# Patient Record
Sex: MALE | URBAN - METROPOLITAN AREA
[De-identification: names, ages, dates, MRNs, and addresses within clinical notes are randomized per-mention and may not be internally consistent; named-entity substitution may affect disease eponyms.]

---

## 2020-03-27 ENCOUNTER — NURSE TRIAGE (OUTPATIENT)
Dept: TELEHEALTH | Age: 25
End: 2020-03-27

## 2020-10-19 ENCOUNTER — HOSPITAL ENCOUNTER (OUTPATIENT)
Age: 25
Discharge: HOME | End: 2020-10-19
Payer: COMMERCIAL

## 2020-10-19 VITALS
DIASTOLIC BLOOD PRESSURE: 79 MMHG | TEMPERATURE: 98 F | HEIGHT: 66 IN | WEIGHT: 168 LBS | RESPIRATION RATE: 17 BRPM | SYSTOLIC BLOOD PRESSURE: 117 MMHG | HEART RATE: 67 BPM | BODY MASS INDEX: 27 KG/M2 | OXYGEN SATURATION: 97 %

## 2020-10-19 DIAGNOSIS — J06.9 UPPER RESPIRATORY TRACT INFECTION, UNSPECIFIED TYPE: Primary | ICD-10-CM

## 2020-10-19 DIAGNOSIS — Z20.822 CLOSE EXPOSURE TO COVID-19 VIRUS: ICD-10-CM

## 2020-10-19 PROCEDURE — 87081 CULTURE SCREEN ONLY: CPT | Performed by: NURSE PRACTITIONER

## 2020-10-19 PROCEDURE — 87430 STREP A AG IA: CPT | Performed by: NURSE PRACTITIONER

## 2020-10-19 PROCEDURE — 99203 OFFICE O/P NEW LOW 30 MIN: CPT

## 2020-10-19 PROCEDURE — 99204 OFFICE O/P NEW MOD 45 MIN: CPT

## 2020-10-19 NOTE — ED PROVIDER NOTES
Patient Seen in: [facility]      History   Patient presents with:  Testing    Stated Complaint: test,exposed,cough,sore throat    HPI  Patient is a 31-year-old male without significant medical history presents with 5-day history of dysgeu ill-appearing, toxic-appearing or diaphoretic. HENT:      Mouth/Throat:      Mouth: Mucous membranes are moist.      Pharynx: No oropharyngeal exudate or posterior oropharyngeal erythema.    Eyes:      Conjunctiva/sclera: Conjunctivae normal.   Cardiovasc

## 2020-10-19 NOTE — ED INITIAL ASSESSMENT (HPI)
patient states developed symptoms 5 days ago.   Lost of taste, non productive cough, sore throat  Denies any fever

## 2020-11-06 ENCOUNTER — APPOINTMENT (OUTPATIENT)
Dept: GENERAL RADIOLOGY | Age: 25
End: 2020-11-06
Attending: PHYSICIAN ASSISTANT
Payer: COMMERCIAL

## 2020-11-06 ENCOUNTER — HOSPITAL ENCOUNTER (OUTPATIENT)
Age: 25
Discharge: HOME | End: 2020-11-06
Payer: COMMERCIAL

## 2020-11-06 VITALS
RESPIRATION RATE: 16 BRPM | TEMPERATURE: 98 F | SYSTOLIC BLOOD PRESSURE: 141 MMHG | HEART RATE: 74 BPM | DIASTOLIC BLOOD PRESSURE: 91 MMHG | OXYGEN SATURATION: 97 %

## 2020-11-06 DIAGNOSIS — R05.9 COUGH: ICD-10-CM

## 2020-11-06 DIAGNOSIS — Z20.822 SUSPECTED COVID-19 VIRUS INFECTION: Primary | ICD-10-CM

## 2020-11-06 PROCEDURE — 71046 X-RAY EXAM CHEST 2 VIEWS: CPT | Performed by: PHYSICIAN ASSISTANT

## 2020-11-06 PROCEDURE — 99213 OFFICE O/P EST LOW 20 MIN: CPT

## 2020-11-06 RX ORDER — DEXAMETHASONE SODIUM PHOSPHATE 4 MG/ML
6 INJECTION, SOLUTION INTRA-ARTICULAR; INTRALESIONAL; INTRAMUSCULAR; INTRAVENOUS; SOFT TISSUE ONCE
Status: COMPLETED | OUTPATIENT
Start: 2020-11-06 | End: 2020-11-06

## 2020-11-06 NOTE — ED INITIAL ASSESSMENT (HPI)
Mom and dad had covid, 2 other roommates covid +; pt tested negative on 10/19/20    Pt with cough/sob x 4-5 days; no fever

## 2020-11-06 NOTE — ED PROVIDER NOTES
MRN:[id number]                      After Visit Summary   9/11/2020    Melina Vazquez    MRN: [id number]           Visit Information        Provider Department      9/11/2020  8:15 AM Yani Whelan, PT [facility], [place], Physical Therapy - Outpatient        Your next 10 appointments already scheduled    Sep 25, 2020  8:15 AM CDT  Ehl/David Treatment with Yani Whelan, PT  [facility] [place], Physical Therapy - Outpatient ([facility]) [address], [facility] [address]  [place] [address]  [phone]      Oct 09, 2020  8:15 AM CDT  Ehl/David Treatment with Yani Whelan, PT  [facility] [place], Physical Therapy - Outpatient ([facility]) [address] [place] [address], [facility] [address]  [place] [address]  [phone]      Oct 23, 2020  8:15 AM CDT  Ehl/David Treatment with Yani Whelan, SERENE  [facility], Physical Therapy - Outpatient ([facility]) [address] [place] [address], [facility] [address]  [place] [address]  [phone]           Further instructions from your care team       Look and see if you have leg weights. If not, it might be worth investing in a set of adjustable weights.    Example: https://www.amazon.com/[employer]-Ankle-Weights-Adjusted-Weight/dp/N8405SAINP/ref=asc_df_B0747ZTVML/?tag=hyprod-20&linkCode=df0&eurmqi=230197607162&hvpos=&hvnetw=g&cjnjmk=[email or web address]&hvpone=&hvptwo=&hvqmt=&hvdev=c&hvdvcmdl=&hvlocint=&zxafjqsk=[email or web address]&hvtargid=[email or web address]&psc=1        Reaching below your navel   - make sure you bend with your knees AND your back    MyChart Information    [employer] gives you secure access to your electronic health record. If you see a primary care provider, you can also send messages to your care team and make appointments. If you have questions, please call your primary care clinic.  If you do not have a primary care provider, please call  Patient Seen in: [facility]      History   Patient presents with:  Cough/URI  Testing    Stated Complaint: COVID TEST    HPI    67-year-old male who comes in today concerned for COVID-19.   He states he has a documented case of COVID-19 in [phone] and they will assist you.       Care EveryWhere ID    This is your Care EveryWhere ID. This could be used by other organizations to access your [place] medical records  [id number]       Equal Access to Services    LENARD DERAS: Bettina Mesa, wayobanyda luyadiraadaha, qaybta kaalmada berenice, sundar deras. So [facility] [phone].    ATENCIÓN: Si habla español, tiene a swanson disposición servicios gratuitos de asistencia lingüística. Llame al [phone].    We comply with applicable federal and state civil rights laws, including the [place] Human Rights Act. We do not discriminate on the basis of race, color, creed, Islam, national origin, marital status, age, disability, sex, sexual orientation, or gender identity.        Clear to auscultation bilaterally, respirations unlabored. No wheezing, rales or rhonchi.        ED Course     Labs Reviewed   SARS-COV-2 RNA,QUAL RT-PCR ([facility])          Xr Chest Pa + Lat Chest (cpt=71046)    Result Date: 11/6/2020  PROCEDURE:  XR CHEST PA the patient that emergent conditions may arise to return to the immediate care or ER for new, worsening or any persistent conditions. I've explained the importance of following up with his doctor- No primary care provider on file. - as instructed.   The p

## 2020-12-10 ENCOUNTER — HOSPITAL ENCOUNTER (OUTPATIENT)
Age: 25
Discharge: HOME | End: 2020-12-10
Payer: COMMERCIAL

## 2020-12-10 ENCOUNTER — APPOINTMENT (OUTPATIENT)
Dept: GENERAL RADIOLOGY | Age: 25
End: 2020-12-10
Attending: PHYSICIAN ASSISTANT
Payer: COMMERCIAL

## 2020-12-10 VITALS
OXYGEN SATURATION: 98 % | BODY MASS INDEX: 28.32 KG/M2 | RESPIRATION RATE: 16 BRPM | HEIGHT: 65 IN | DIASTOLIC BLOOD PRESSURE: 71 MMHG | HEART RATE: 107 BPM | WEIGHT: 170 LBS | TEMPERATURE: 98 F | SYSTOLIC BLOOD PRESSURE: 124 MMHG

## 2020-12-10 DIAGNOSIS — S52.615A CLOSED NONDISPLACED FRACTURE OF STYLOID PROCESS OF LEFT ULNA, INITIAL ENCOUNTER: Primary | ICD-10-CM

## 2020-12-10 PROCEDURE — 99214 OFFICE O/P EST MOD 30 MIN: CPT

## 2020-12-10 PROCEDURE — 73130 X-RAY EXAM OF HAND: CPT | Performed by: PHYSICIAN ASSISTANT

## 2020-12-10 PROCEDURE — 73110 X-RAY EXAM OF WRIST: CPT | Performed by: PHYSICIAN ASSISTANT

## 2020-12-11 NOTE — ED PROVIDER NOTES
Patient Seen in: [facility]      History   Patient presents with:  Arm or Hand Injury    Stated Complaint: Left arm injury    HPI    71-year-old male presents to the immediate care for evaluation of left arm pain that started just prior t and no deformity. Left forearm: Normal.      Left hand: He exhibits tenderness (across dorsum). He exhibits normal range of motion, no deformity and no swelling. Normal sensation noted. Normal strength noted. Skin:     General: Skin is warm and dry. subtle nondisplaced fracture of the ulnar styloid. Please correlate clinically.    Dictated by (CST): Donny Schilder, DO on 12/10/2020 at 8:02 PM     Finalized by (CST): Donny Schilder,  on 12/10/2020 at 8:02 PM             MDM          26-year-old male with

## 2020-12-11 NOTE — ED INITIAL ASSESSMENT (HPI)
Micheal Mariee PA-C at the bedside assessing. Patient here with left hand, wrist, and forearm pain that occurred this evening around 1900 when he was running and someone hit him from the side. He states he fell onto concrete. He has limited ROM.

## 2020-12-14 ENCOUNTER — OFFICE VISIT (OUTPATIENT)
Dept: ORTHOPEDICS CLINIC | Facility: CLINIC | Age: 25
End: 2020-12-14
Payer: COMMERCIAL

## 2020-12-14 VITALS — OXYGEN SATURATION: 99 % | HEART RATE: 80 BPM

## 2020-12-14 DIAGNOSIS — S52.612A TRAUMATIC CLOSED FRACTURE OF ULNAR STYLOID WITH MINIMAL DISPLACEMENT, LEFT, INITIAL ENCOUNTER: Primary | ICD-10-CM

## 2020-12-14 PROCEDURE — 25650 CLTX ULNAR STYLOID FRACTURE: CPT | Performed by: ORTHOPAEDIC SURGERY

## 2020-12-14 PROCEDURE — 99203 OFFICE O/P NEW LOW 30 MIN: CPT | Performed by: ORTHOPAEDIC SURGERY

## 2020-12-17 NOTE — H&P
[facility] New Patient Note    CC: Left wrist pain    DOI: 12/10/2020    HPI: This 26year old RHD  male with with a injury while skateboarding. He has got moderate amounts of left wrist pain.   He was seen in the emergency department where an ulnar Psychiatric/Behavioral: Negative for agitation and behavioral problems. The patient is not nervous/anxious. Physical Exam:    Pulse 80   SpO2 99%     Constitutional: Patient is oriented to person, place, and time.  Patient appears well-developed and

## 2020-12-22 ENCOUNTER — HOSPITAL ENCOUNTER (OUTPATIENT)
Dept: GENERAL RADIOLOGY | Age: 25
Discharge: HOME | End: 2020-12-22
Attending: ORTHOPAEDIC SURGERY
Payer: COMMERCIAL

## 2020-12-22 ENCOUNTER — OFFICE VISIT (OUTPATIENT)
Dept: ORTHOPEDICS CLINIC | Facility: CLINIC | Age: 25
End: 2020-12-22
Payer: COMMERCIAL

## 2020-12-22 VITALS — OXYGEN SATURATION: 98 % | HEART RATE: 80 BPM

## 2020-12-22 DIAGNOSIS — S52.612A TRAUMATIC CLOSED FRACTURE OF ULNAR STYLOID WITH MINIMAL DISPLACEMENT, LEFT, INITIAL ENCOUNTER: Primary | ICD-10-CM

## 2020-12-22 DIAGNOSIS — S52.612A TRAUMATIC CLOSED FRACTURE OF ULNAR STYLOID WITH MINIMAL DISPLACEMENT, LEFT, INITIAL ENCOUNTER: ICD-10-CM

## 2020-12-22 PROCEDURE — 99024 POSTOP FOLLOW-UP VISIT: CPT | Performed by: ORTHOPAEDIC SURGERY

## 2020-12-22 PROCEDURE — 73110 X-RAY EXAM OF WRIST: CPT | Performed by: ORTHOPAEDIC SURGERY

## 2021-01-01 ENCOUNTER — MED REC SCAN ONLY (OUTPATIENT)
Dept: ORTHOPEDICS CLINIC | Facility: CLINIC | Age: 26
End: 2021-01-01

## 2021-01-06 ENCOUNTER — OFFICE VISIT (OUTPATIENT)
Dept: ORTHOPEDICS CLINIC | Facility: CLINIC | Age: 26
End: 2021-01-06
Payer: COMMERCIAL

## 2021-01-06 ENCOUNTER — HOSPITAL ENCOUNTER (OUTPATIENT)
Dept: GENERAL RADIOLOGY | Age: 26
Discharge: HOME | End: 2021-01-06
Attending: ORTHOPAEDIC SURGERY
Payer: COMMERCIAL

## 2021-01-06 VITALS — HEART RATE: 104 BPM | OXYGEN SATURATION: 98 %

## 2021-01-06 DIAGNOSIS — S52.612A TRAUMATIC CLOSED FRACTURE OF ULNAR STYLOID WITH MINIMAL DISPLACEMENT, LEFT, INITIAL ENCOUNTER: Primary | ICD-10-CM

## 2021-01-06 DIAGNOSIS — M25.532 WRIST PAIN, LEFT: ICD-10-CM

## 2021-01-06 PROCEDURE — 99024 POSTOP FOLLOW-UP VISIT: CPT | Performed by: ORTHOPAEDIC SURGERY

## 2021-01-06 PROCEDURE — 73110 X-RAY EXAM OF WRIST: CPT | Performed by: ORTHOPAEDIC SURGERY

## 2021-01-06 NOTE — PROGRESS NOTES
[facility] New Patient Note    CC: Left wrist pain    DOI: 12/10/2020    HPI: This 26year old RHD  male with with a injury while skateboarding. He has got moderate amounts of left wrist pain.   He was seen in the emergency department where an ulnar Physical Exam:    Pulse 104   SpO2 98%     Constitutional: Patient is oriented to person, place, and time. Patient appears well-developed and well-nourished. No distress. Head: Normocephalic. Eyes: Pupils are equal, round, and reactive to light.  C

## 2021-01-22 ENCOUNTER — HOSPITAL ENCOUNTER (OUTPATIENT)
Dept: MRI IMAGING | Facility: HOSPITAL | Age: 26
Discharge: HOME | End: 2021-01-22
Attending: ORTHOPAEDIC SURGERY
Payer: COMMERCIAL

## 2021-01-22 DIAGNOSIS — S52.612A TRAUMATIC CLOSED FRACTURE OF ULNAR STYLOID WITH MINIMAL DISPLACEMENT, LEFT, INITIAL ENCOUNTER: ICD-10-CM

## 2021-01-22 PROCEDURE — 73221 MRI JOINT UPR EXTREM W/O DYE: CPT | Performed by: ORTHOPAEDIC SURGERY

## 2021-02-04 ENCOUNTER — OFFICE VISIT (OUTPATIENT)
Dept: ORTHOPEDICS CLINIC | Facility: CLINIC | Age: 26
End: 2021-02-04
Payer: COMMERCIAL

## 2021-02-04 VITALS — OXYGEN SATURATION: 99 % | HEART RATE: 90 BPM

## 2021-02-04 DIAGNOSIS — S52.612D CLOSED DISPLACED FRACTURE OF STYLOID PROCESS OF LEFT ULNA WITH ROUTINE HEALING, SUBSEQUENT ENCOUNTER: Primary | ICD-10-CM

## 2021-02-04 PROCEDURE — 99024 POSTOP FOLLOW-UP VISIT: CPT | Performed by: ORTHOPAEDIC SURGERY

## 2021-02-16 NOTE — PROGRESS NOTES
[facility] New Patient Note    CC: Left wrist pain    DOI: 12/10/2020    HPI: This 26year old RHD  male with with a injury while skateboarding. He has got moderate amounts of left wrist pain.   He was seen in the emergency department where an ulnar Psychiatric/Behavioral: Negative for agitation and behavioral problems. The patient is not nervous/anxious. Physical Exam:    Pulse 90   SpO2 99%     Constitutional: Patient is oriented to person, place, and time.  Patient appears well-developed and 26year old male with a nondisplaced left ulnar styloid fracture that shows radiographic signs of healing. There is less of a supination block however a click was noted on examination that was intermittent in nature.   Certain times the wrist pronation sup

## 2021-02-22 ENCOUNTER — TELEPHONE (OUTPATIENT)
Dept: PHYSICAL THERAPY | Facility: HOSPITAL | Age: 26
End: 2021-02-22

## 2021-02-25 ENCOUNTER — APPOINTMENT (OUTPATIENT)
Dept: OCCUPATIONAL MEDICINE | Facility: HOSPITAL | Age: 26
End: 2021-02-25
Attending: ORTHOPAEDIC SURGERY

## 2021-02-26 ENCOUNTER — TELEPHONE (OUTPATIENT)
Dept: OCCUPATIONAL MEDICINE | Facility: HOSPITAL | Age: 26
End: 2021-02-26

## 2021-03-01 ENCOUNTER — OFFICE VISIT (OUTPATIENT)
Dept: OCCUPATIONAL MEDICINE | Facility: HOSPITAL | Age: 26
End: 2021-03-01
Attending: ORTHOPAEDIC SURGERY

## 2021-03-01 PROCEDURE — 97110 THERAPEUTIC EXERCISES: CPT

## 2021-03-01 PROCEDURE — 97166 OT EVAL MOD COMPLEX 45 MIN: CPT

## 2021-03-01 NOTE — PROGRESS NOTES
OCCUPATIONAL THERAPY UPPER EXTREMITY EVALUATION   Referring Physician: Dr. Lui Church  Diagnosis: Closed displaced fracture of styloid process of left ulna with routine healing, subsequent encounter (I80.018S)     Date of Service: 3/1/2021     PATIENT SUMMARY styloid f/x (Dec 2020). Hossein Basilio presents to occupational therapy evaluation with primary c/o L wrist and forearm decreased ROM, numbness/pain, and swelling/edema. Pt sustained L wrist injury while skateboarding in December 2020.  He was seen in 75  Extension: R 75, L 65  Ulnar Deviation: R 40, L 40  Radial Deviation R 15, L 5     AROM/PROM: Pt BUE D1-D5 AROM WNL    MANUAL MUSCLE TESTING: NT    Strength (lbs) Right Average Left Average   : 88.0 lbs 50.0 lbs   2 pt Pinch: 7.5 lbs 0.5 lbs   3 pt management. 5. Pt will demonstrate functional L  strength of at least 65.0 lbs indicating increased ability to manage lifting and object management for functional daily activities.   6. Pt will demonstrate at least 8 lbs L 3-Point pinch strength indica

## 2021-03-04 ENCOUNTER — OFFICE VISIT (OUTPATIENT)
Dept: ORTHOPEDICS CLINIC | Facility: CLINIC | Age: 26
End: 2021-03-04
Payer: COMMERCIAL

## 2021-03-04 ENCOUNTER — OFFICE VISIT (OUTPATIENT)
Dept: OCCUPATIONAL MEDICINE | Facility: HOSPITAL | Age: 26
End: 2021-03-04
Attending: ORTHOPAEDIC SURGERY

## 2021-03-04 VITALS — HEART RATE: 88 BPM | OXYGEN SATURATION: 99 %

## 2021-03-04 DIAGNOSIS — S52.612D CLOSED DISPLACED FRACTURE OF STYLOID PROCESS OF LEFT ULNA WITH ROUTINE HEALING, SUBSEQUENT ENCOUNTER: Primary | ICD-10-CM

## 2021-03-04 PROCEDURE — 97035 APP MDLTY 1+ULTRASOUND EA 15: CPT

## 2021-03-04 PROCEDURE — 99024 POSTOP FOLLOW-UP VISIT: CPT | Performed by: ORTHOPAEDIC SURGERY

## 2021-03-04 PROCEDURE — 97110 THERAPEUTIC EXERCISES: CPT

## 2021-03-04 NOTE — PROGRESS NOTES
Dx: Closed displaced fracture of styloid process of left ulna with routine healing, subsequent encounter (I37.064Z)          Insurance (Authorized # of Visits):  6           Authorizing Physician: Dr. John Alexander MD visit: 3/4/2021  Fall Risk: standard manage functional handwriting and [facility] activities. 7. Pt will demonstrate at least 24 seconds on the 9-Hole Peg Test for L arm indicating increased [facility] for functional money management and handwriting.   8. Pt will be independent and compliant with comprehe

## 2021-03-04 NOTE — PROGRESS NOTES
[facility] New Patient Note    CC: Left wrist pain    DOI: 12/10/2020    HPI: This 26year old RHD  male with with a injury while skateboarding. He has got moderate amounts of left wrist pain.   He was seen in the emergency department where an ulnar Negative for agitation and behavioral problems. The patient is not nervous/anxious. Physical Exam:    Pulse 88   SpO2 99%     Constitutional: Patient is oriented to person, place, and time. Patient appears well-developed and well-nourished.  No dist

## 2021-03-09 ENCOUNTER — TELEPHONE (OUTPATIENT)
Dept: PHYSICAL THERAPY | Facility: HOSPITAL | Age: 26
End: 2021-03-09

## 2021-03-09 ENCOUNTER — APPOINTMENT (OUTPATIENT)
Dept: OCCUPATIONAL MEDICINE | Facility: HOSPITAL | Age: 26
End: 2021-03-09
Attending: ORTHOPAEDIC SURGERY

## 2021-03-11 ENCOUNTER — OFFICE VISIT (OUTPATIENT)
Dept: OCCUPATIONAL MEDICINE | Facility: HOSPITAL | Age: 26
End: 2021-03-11
Attending: ORTHOPAEDIC SURGERY

## 2021-03-11 PROCEDURE — 97035 APP MDLTY 1+ULTRASOUND EA 15: CPT

## 2021-03-11 PROCEDURE — 97110 THERAPEUTIC EXERCISES: CPT

## 2021-03-12 NOTE — PROGRESS NOTES
Dx: Closed displaced fracture of styloid process of left ulna with routine healing, subsequent encounter (W50.342K)          Insurance (Authorized # of Visits):  6           Authorizing Physician: Dr. Anderson Dale  Next MD visit: 3/4/2021  Fall Risk: standard [facility] for functional money management and handwriting. 8. Pt will be independent and compliant with comprehensive HEP to maintain progress achieved in OT.     Plan: Patient will continue to be seen for 2x/week or a total of 8 visits over a 90 day p

## 2021-03-16 ENCOUNTER — OFFICE VISIT (OUTPATIENT)
Dept: OCCUPATIONAL MEDICINE | Facility: HOSPITAL | Age: 26
End: 2021-03-16
Attending: ORTHOPAEDIC SURGERY

## 2021-03-16 PROCEDURE — 97140 MANUAL THERAPY 1/> REGIONS: CPT

## 2021-03-16 PROCEDURE — 97110 THERAPEUTIC EXERCISES: CPT

## 2021-03-16 PROCEDURE — 97035 APP MDLTY 1+ULTRASOUND EA 15: CPT

## 2021-03-16 NOTE — PROGRESS NOTES
Dx: Closed displaced fracture of styloid process of left ulna with routine healing, subsequent encounter (A11.833H)          Insurance (Authorized # of Visits):  6           Authorizing Physician: Dr. Garland Workman  Next MD visit: 3/4/2021  Fall Risk: standard pinch strength indicating increased ability to manage functional handwriting and [facility] activities. 7. Pt will demonstrate at least 24 seconds on the 9-Hole Peg Test for L arm indicating increased [facility] for functional money management and handwriting.   8. Pt exercises for wrist flexion, wrist extension, ulnar deviation 2 set of 10 reps     HEP: 4lb Wrist Exercises (Flexion/Extension/Ulnar Deviation/Radial Deviation);  AROM Forearm Pronation/Supination each 2 sets of 10 reps, hold 3 seconds, 2-3x/day; Yellow The

## 2021-03-18 ENCOUNTER — OFFICE VISIT (OUTPATIENT)
Dept: OCCUPATIONAL MEDICINE | Facility: HOSPITAL | Age: 26
End: 2021-03-18
Attending: ORTHOPAEDIC SURGERY

## 2021-03-18 PROCEDURE — 97110 THERAPEUTIC EXERCISES: CPT

## 2021-03-18 PROCEDURE — 97035 APP MDLTY 1+ULTRASOUND EA 15: CPT

## 2021-03-18 NOTE — PROGRESS NOTES
Dx: Closed displaced fracture of styloid process of left ulna with routine healing, subsequent encounter (B71.395C)          Insurance (Authorized # of Visits):  6           Authorizing Physician: Dr. Tanja Bosworth  Next MD visit: 3/4/2021  Fall Risk: standard handwriting and [facility] activities. 7. Pt will demonstrate at least 24 seconds on the 9-Hole Peg Test for L arm indicating increased [facility] for functional money management and handwriting.   8. Pt will be independent and compliant with comprehensive HEP to maint rotation, shoulder extension, external rotation 2 sets of 15 reps  -2lb Rebounder 2 sets of 15 reps       HEP: 4lb Wrist Exercises (Flexion/Extension/Ulnar Deviation/Radial Deviation);  AROM Forearm Pronation/Supination each 2 sets of 10 reps, hold 3 second

## 2021-03-23 ENCOUNTER — OFFICE VISIT (OUTPATIENT)
Dept: OCCUPATIONAL MEDICINE | Facility: HOSPITAL | Age: 26
End: 2021-03-23
Attending: ORTHOPAEDIC SURGERY

## 2021-03-23 PROCEDURE — 97110 THERAPEUTIC EXERCISES: CPT

## 2021-03-23 PROCEDURE — 97035 APP MDLTY 1+ULTRASOUND EA 15: CPT

## 2021-03-23 NOTE — PROGRESS NOTES
Dx: Closed displaced fracture of styloid process of left ulna with routine healing, subsequent encounter (S13.947H)          Insurance (Authorized # of Visits):  [address] Physician: Dr. Krishna Banks  Next MD visit: N/A  Fall Risk: standard ADL/IADL management. 4. Pt will demonstrate L wrist circumferential decreased to 17.5cm indicating decreased swelling/edema to manage AROM for functional ADL management.   5. Pt will demonstrate functional L  strength of at least 65.0 lbs indicating in wrist weight 3 sets of 10 reps ulnar deviation, radial deviation  -Green theraband pulling activity internal rotation, shoulder extension, external rotation 2 sets of 15 reps  -2lb Rebounder 2 sets of 15 reps   -US treatment with settings 1.2 W/cm2, 50% du

## 2021-03-25 ENCOUNTER — OFFICE VISIT (OUTPATIENT)
Dept: OCCUPATIONAL MEDICINE | Facility: HOSPITAL | Age: 26
End: 2021-03-25
Attending: ORTHOPAEDIC SURGERY

## 2021-03-25 PROCEDURE — 97110 THERAPEUTIC EXERCISES: CPT

## 2021-03-25 PROCEDURE — 97035 APP MDLTY 1+ULTRASOUND EA 15: CPT

## 2021-03-25 NOTE — PROGRESS NOTES
Dx: Closed displaced fracture of styloid process of left ulna with routine healing, subsequent encounter (R80.980Q)          Insurance (Authorized # of Visits):  6           Authorizing Physician: Dr. Tanja Bosworth  Next MD visit: 3/26/2021  Fall Risk: standard 7. Pt will demonstrate at least 24 seconds on the 9-Hole Peg Test for L arm indicating increased [facility] for functional money management and handwriting. 8. Pt will be independent and compliant with comprehensive HEP to maintain progress achieved in OT. each for wrist flexion, wrist extension, ulnar deviation  -20 lb basket lifting and moving with focus on proper body mechanics 2 sets of 10 reps bilaterally         HEP: 5lb Wrist Exercises (Flexion/Extension/Ulnar Deviation/Radial Deviation);  AROM Forearm

## 2021-03-29 ENCOUNTER — OFFICE VISIT (OUTPATIENT)
Dept: ORTHOPEDICS CLINIC | Facility: CLINIC | Age: 26
End: 2021-03-29
Payer: COMMERCIAL

## 2021-03-29 VITALS — OXYGEN SATURATION: 100 % | HEART RATE: 86 BPM

## 2021-03-29 DIAGNOSIS — M77.8 LEFT WRIST TENDONITIS: Primary | ICD-10-CM

## 2021-03-29 PROCEDURE — 99213 OFFICE O/P EST LOW 20 MIN: CPT | Performed by: ORTHOPAEDIC SURGERY

## 2021-03-29 RX ORDER — MELOXICAM 15 MG/1
15 TABLET ORAL DAILY
Qty: 30 TABLET | Refills: 0 | Status: SHIPPED | OUTPATIENT
Start: 2021-03-29

## 2021-03-29 NOTE — PROGRESS NOTES
[facility] New Patient Note    CC: Left wrist pain    DOI: 12/10/2020    HPI: This 26year old RHD  male with with a injury while skateboarding. He has got moderate amounts of left wrist pain.   He was seen in the emergency department where an ulnar dizziness, syncope and speech difficulty. Hematological: Does not bruise/bleed easily. Psychiatric/Behavioral: Negative for agitation and behavioral problems. The patient is not nervous/anxious.          Physical Exam:    Pulse 86   SpO2 100%     Consti

## 2021-03-30 ENCOUNTER — PATIENT MESSAGE (OUTPATIENT)
Dept: ORTHOPEDICS CLINIC | Facility: CLINIC | Age: 26
End: 2021-03-30

## 2021-03-30 NOTE — TELEPHONE ENCOUNTER
From: Garth Hester  To: Moni Anguiano MD  Sent: 3/30/2021 11:18 AM CDT  Subject: Visit Follow-up Question    [facility] doctor Anthony Jean, my job was asking for specific job restrictions in form of a note.  Is there anyway to do basically one hand work on

## 2021-04-09 ENCOUNTER — APPOINTMENT (OUTPATIENT)
Dept: OCCUPATIONAL MEDICINE | Facility: HOSPITAL | Age: 26
End: 2021-04-09
Attending: ORTHOPAEDIC SURGERY
Payer: COMMERCIAL

## 2021-04-09 ENCOUNTER — TELEPHONE (OUTPATIENT)
Dept: OCCUPATIONAL MEDICINE | Facility: HOSPITAL | Age: 26
End: 2021-04-09

## 2021-04-19 ENCOUNTER — TELEPHONE (OUTPATIENT)
Dept: PHYSICAL THERAPY | Facility: HOSPITAL | Age: 26
End: 2021-04-19

## 2021-04-20 ENCOUNTER — OFFICE VISIT (OUTPATIENT)
Dept: OCCUPATIONAL MEDICINE | Facility: HOSPITAL | Age: 26
End: 2021-04-20
Attending: ORTHOPAEDIC SURGERY
Payer: COMMERCIAL

## 2021-04-20 DIAGNOSIS — M77.8 LEFT WRIST TENDONITIS: ICD-10-CM

## 2021-04-20 PROCEDURE — 97110 THERAPEUTIC EXERCISES: CPT

## 2021-04-20 NOTE — PROGRESS NOTES
Progress Summary  Dx: Closed displaced fracture of styloid process of left ulna with routine healing, subsequent encounter (S57.007S)          Insurance (Authorized # of Visits):  8/8 [address] [place] [address] [address] Physician: Dr. Marquis Viera  Next MD visit: 4/26/2021 ( reports no increase in pain and WNL strength and AROM for BUE at this time. Pt reports some “foreign feeling” with lifting, throwing, and twisting L wrist movements. ORTHOTICS: Pt owns wrist cock-up splint but currently does not wear splint.  Pt instruc technique and form with no increase in pain/discomfort for 20 lb luggage transfer activity of at least 7 min indicating increased ability [place] NEW GOAL  10.  Pt will report no increase in discomfort or \"foreign feeling\" in reps  -8 lb kettle bell bilateral lifting with pt education on proper form/sequencing when lifting objects -US treatment with settings 1.2 W/cm2, 50% duty, 3.3 MHz to volar distal wrist flexors for 8 min  -Joint mobilizations and joint distractions for L w

## 2021-04-22 ENCOUNTER — OFFICE VISIT (OUTPATIENT)
Dept: OCCUPATIONAL MEDICINE | Facility: HOSPITAL | Age: 26
End: 2021-04-22
Attending: ORTHOPAEDIC SURGERY
Payer: COMMERCIAL

## 2021-04-22 PROCEDURE — 97110 THERAPEUTIC EXERCISES: CPT

## 2021-04-22 NOTE — PROGRESS NOTES
Dx: Closed displaced fracture of styloid process of left ulna with routine healing, subsequent encounter (J61.445G)          Insurance (Authorized # of Visits):  9/11 [address] [place] [address] [address] Physician: Dr. Zara Montalvo  Next MD visit: 4/26/2021 (Dr. Zara Montalvo)  Fall Risk ability to manage handwriting and bilateral ADL/IADL activities. MET  3. Pt will report 0/10 pain in L wrist with functional activities indicating increased (I) w/ ADL/IADL management. MET  4.  Pt will demonstrate L wrist circumferential decreased to 17.5cm wrist  -IASTM to L volar ulnar side wrist flexors  -20 lb basket lifting and moving with focus on proper body mechanics 2 sets of 10 reps bilaterally     -Reassessment of Objective Measures  -Patient Education discussion of POC and current goals  -Simulate

## 2021-04-26 ENCOUNTER — OFFICE VISIT (OUTPATIENT)
Dept: ORTHOPEDICS CLINIC | Facility: CLINIC | Age: 26
End: 2021-04-26
Payer: COMMERCIAL

## 2021-04-26 VITALS — OXYGEN SATURATION: 99 % | HEART RATE: 83 BPM

## 2021-04-26 DIAGNOSIS — S52.612D CLOSED DISPLACED FRACTURE OF STYLOID PROCESS OF LEFT ULNA WITH ROUTINE HEALING, SUBSEQUENT ENCOUNTER: Primary | ICD-10-CM

## 2021-04-26 PROCEDURE — 99213 OFFICE O/P EST LOW 20 MIN: CPT | Performed by: ORTHOPAEDIC SURGERY

## 2021-04-27 ENCOUNTER — OFFICE VISIT (OUTPATIENT)
Dept: OCCUPATIONAL MEDICINE | Facility: HOSPITAL | Age: 26
End: 2021-04-27
Attending: ORTHOPAEDIC SURGERY
Payer: COMMERCIAL

## 2021-04-27 PROCEDURE — 97110 THERAPEUTIC EXERCISES: CPT

## 2021-04-27 NOTE — PROGRESS NOTES
Dx: Closed displaced fracture of styloid process of left ulna with routine healing, subsequent encounter (Z07.460Z)          Insurance (Authorized # of Visits):  10/11 [address] [place] [address] [address] Physician: Dr. Rebecca Lundborg  Next MD visit: TBD  Fall Risk: standard exercises and for increased blood flow/healing. Plan: Continue skilled Occupational Therapy 2x/week or a total of 11 visits over a 90 day period.  Treatment will include: Manual Therapy, Neuromuscular Re-education, Self-Care Home Management, Therapeutic increased ability to manage work duties and ADL/IADL management.      Date: 4/20/2021  TX#: 8/11 Date: 4/22/2021  TX#: 9/11 Date: 4/27/2021  TX#: 10/11   -Reassessment of Objective Measures  -Patient Education discussion of POC and current goals  -Simulated

## 2021-04-29 ENCOUNTER — OFFICE VISIT (OUTPATIENT)
Dept: OCCUPATIONAL MEDICINE | Facility: HOSPITAL | Age: 26
End: 2021-04-29
Attending: ORTHOPAEDIC SURGERY
Payer: COMMERCIAL

## 2021-04-29 PROCEDURE — 97110 THERAPEUTIC EXERCISES: CPT

## 2021-04-29 NOTE — PROGRESS NOTES
Discharge Summary  Dx: Closed displaced fracture of styloid process of left ulna with routine healing, subsequent encounter (S52.087K)          Insurance (Authorized # of Visits):  11/11 [address] [place] [address] [address] Physician: Dr. Garland Alexander MD visit: 5/24/2021 WNL    MMT: BUE 5/5 WNL     Strength (lbs) Right Average Left Average   : 120.0 lbs 85.0 lbs   2 pt Pinch: 16.0 lbs 14.0 lbs   3 pt Pinch: 18.0 lbs 14.0 lbs   Lateral Pinch: 26.0 lbs 22.0 lbs      SPECIAL TESTS:   Nine-Hole Peg Test: R=21.3 sec; L=22.4 services at this time with appropriate follow-through with HEP exercises. Patient/Family/Caregiver was advised of these findings, precautions, and treatment options and has agreed to actively participate in planning and for this course of care.     Thank extension, ulnar deviation, radial deviation 3 sets of 10 reps  -20 lb therapy ball lifting and moving endurance and strengthening exercise for 10 min to simulate work duties     HEP: 5lb Wrist Exercises (Flexion/Extension/Ulnar Deviation/Radial Deviation)

## 2021-05-04 ENCOUNTER — APPOINTMENT (OUTPATIENT)
Dept: OCCUPATIONAL MEDICINE | Facility: HOSPITAL | Age: 26
End: 2021-05-04
Attending: ORTHOPAEDIC SURGERY
Payer: COMMERCIAL

## 2021-05-06 ENCOUNTER — APPOINTMENT (OUTPATIENT)
Dept: OCCUPATIONAL MEDICINE | Facility: HOSPITAL | Age: 26
End: 2021-05-06
Attending: ORTHOPAEDIC SURGERY
Payer: COMMERCIAL

## 2021-05-07 ENCOUNTER — PATIENT MESSAGE (OUTPATIENT)
Dept: ORTHOPEDICS CLINIC | Facility: CLINIC | Age: 26
End: 2021-05-07

## 2021-05-07 NOTE — TELEPHONE ENCOUNTER
From: Sulma Sánchez  To: Sheryle Farrow, MD  Sent: 5/7/2021 12:35 PM CDT  Subject: Non-Urgent Medical Question    Can I get a note to return to work on 05/11/2021? Wrist back at 100 percent again.

## 2021-05-10 ENCOUNTER — APPOINTMENT (OUTPATIENT)
Dept: OCCUPATIONAL MEDICINE | Facility: HOSPITAL | Age: 26
End: 2021-05-10
Attending: ORTHOPAEDIC SURGERY
Payer: COMMERCIAL

## 2021-05-10 NOTE — TELEPHONE ENCOUNTER
From: Sulma Sánchez  Sent: 5/10/2021 11:52 AM CDT  To: [facility] Clinical Pool  Subject: RE:Work Note    Hello, I was just wondering if I can have the same note but with a returning date of 05/11/2021.  Work will not take me in unless I hav

## 2021-05-12 ENCOUNTER — APPOINTMENT (OUTPATIENT)
Dept: OCCUPATIONAL MEDICINE | Facility: HOSPITAL | Age: 26
End: 2021-05-12
Attending: ORTHOPAEDIC SURGERY
Payer: COMMERCIAL

## 2021-05-28 NOTE — PROGRESS NOTES
[facility] New Patient Note    CC: Left wrist pain    DOI: 12/10/2020    HPI: This 26year old RHD  male with with a injury while skateboarding. He has got moderate amounts of left wrist pain.   He was seen in the emergency department where an ulnar Psychiatric/Behavioral: Negative for agitation and behavioral problems. The patient is not nervous/anxious. Physical Exam:    Pulse 83   SpO2 99%     Constitutional: Patient is oriented to person, place, and time.  Patient appears well-developed an

## 2022-03-15 ENCOUNTER — OFFICE VISIT (OUTPATIENT)
Dept: ORTHOPEDICS CLINIC | Facility: CLINIC | Age: 27
End: 2022-03-15
Payer: COMMERCIAL

## 2022-03-15 VITALS — HEIGHT: 65 IN | WEIGHT: 160 LBS | BODY MASS INDEX: 26.66 KG/M2

## 2022-03-15 DIAGNOSIS — M77.8 WRIST TENDONITIS: Primary | ICD-10-CM

## 2022-03-15 PROCEDURE — 3008F BODY MASS INDEX DOCD: CPT | Performed by: ORTHOPAEDIC SURGERY

## 2022-03-15 PROCEDURE — 99213 OFFICE O/P EST LOW 20 MIN: CPT | Performed by: ORTHOPAEDIC SURGERY

## 2022-03-15 RX ORDER — MELOXICAM 15 MG/1
15 TABLET ORAL DAILY
Qty: 30 TABLET | Refills: 0 | Status: SHIPPED | OUTPATIENT
Start: 2022-03-15

## 2022-03-29 ENCOUNTER — PATIENT MESSAGE (OUTPATIENT)
Dept: ORTHOPEDICS CLINIC | Facility: CLINIC | Age: 27
End: 2022-03-29

## 2022-03-29 ENCOUNTER — OFFICE VISIT (OUTPATIENT)
Dept: ORTHOPEDICS CLINIC | Facility: CLINIC | Age: 27
End: 2022-03-29
Payer: COMMERCIAL

## 2022-03-29 VITALS — WEIGHT: 165 LBS | BODY MASS INDEX: 26.52 KG/M2 | HEIGHT: 66 IN

## 2022-03-29 DIAGNOSIS — S52.612D CLOSED DISPLACED FRACTURE OF STYLOID PROCESS OF LEFT ULNA WITH ROUTINE HEALING, SUBSEQUENT ENCOUNTER: Primary | ICD-10-CM

## 2022-03-29 PROCEDURE — 99213 OFFICE O/P EST LOW 20 MIN: CPT | Performed by: ORTHOPAEDIC SURGERY

## 2022-03-29 PROCEDURE — 3008F BODY MASS INDEX DOCD: CPT | Performed by: ORTHOPAEDIC SURGERY

## 2022-03-29 NOTE — TELEPHONE ENCOUNTER
From: Matthew Phillips  To: Phong Funes MD  Sent: 3/29/2022 11:52 AM CDT  Subject: work forms    Hi, I found an online version that works too

## 2022-03-31 ENCOUNTER — TELEPHONE (OUTPATIENT)
Dept: ORTHOPEDICS CLINIC | Facility: CLINIC | Age: 27
End: 2022-03-31

## 2022-03-31 NOTE — TELEPHONE ENCOUNTER
Patient dropped off [facility] paperwork. Paid $25 form fee. Patient stated he will  paperwork when ready.

## 2022-03-31 NOTE — TELEPHONE ENCOUNTER
Patient paperwork was received however he is missing the first page of the forms . Patient was notified via phone he has to bring in the missing form he stated okay.

## 2022-04-01 ENCOUNTER — MED REC SCAN ONLY (OUTPATIENT)
Dept: ORTHOPEDICS CLINIC | Facility: CLINIC | Age: 27
End: 2022-04-01

## 2022-04-15 ENCOUNTER — PATIENT MESSAGE (OUTPATIENT)
Dept: ORTHOPEDICS CLINIC | Facility: CLINIC | Age: 27
End: 2022-04-15

## 2022-04-15 NOTE — TELEPHONE ENCOUNTER
From: Gloria Deaenoc  Sent: 4/15/2022 12:55 PM CDT  To: [facility] Clinical Pool  Subject: work forms    Is it possible to get another doctor's note? Wrist started feeling odd again.

## 2022-04-19 ENCOUNTER — OFFICE VISIT (OUTPATIENT)
Dept: ORTHOPEDICS CLINIC | Facility: CLINIC | Age: 27
End: 2022-04-19
Payer: COMMERCIAL

## 2022-04-19 VITALS — HEIGHT: 66 IN | WEIGHT: 169 LBS | BODY MASS INDEX: 27.16 KG/M2

## 2022-04-19 DIAGNOSIS — M77.8 WRIST TENDONITIS: Primary | ICD-10-CM

## 2022-04-19 PROCEDURE — 99212 OFFICE O/P EST SF 10 MIN: CPT | Performed by: ORTHOPAEDIC SURGERY

## 2022-04-19 PROCEDURE — 3008F BODY MASS INDEX DOCD: CPT | Performed by: ORTHOPAEDIC SURGERY

## 2022-07-19 ENCOUNTER — OFFICE VISIT (OUTPATIENT)
Dept: FAMILY MEDICINE CLINIC | Facility: CLINIC | Age: 27
End: 2022-07-19
Payer: COMMERCIAL

## 2022-07-19 VITALS
HEIGHT: 66 IN | SYSTOLIC BLOOD PRESSURE: 112 MMHG | BODY MASS INDEX: 28.13 KG/M2 | HEART RATE: 78 BPM | RESPIRATION RATE: 16 BRPM | WEIGHT: 175 LBS | DIASTOLIC BLOOD PRESSURE: 70 MMHG | OXYGEN SATURATION: 99 %

## 2022-07-19 DIAGNOSIS — Z11.3 SCREEN FOR STD (SEXUALLY TRANSMITTED DISEASE): ICD-10-CM

## 2022-07-19 DIAGNOSIS — Z00.00 ANNUAL PHYSICAL EXAM: Primary | ICD-10-CM

## 2022-07-19 DIAGNOSIS — Z00.00 LABORATORY EXAM ORDERED AS PART OF ROUTINE GENERAL MEDICAL EXAMINATION: ICD-10-CM

## 2022-07-19 LAB
ALBUMIN SERPL-MCNC: 4.6 G/DL (ref 3.4–5)
ALBUMIN/GLOB SERPL: 1.3 {RATIO} (ref 1–2)
ALP LIVER SERPL-CCNC: 67 U/L
ALT SERPL-CCNC: 25 U/L
ANION GAP SERPL CALC-SCNC: 1 MMOL/L (ref 0–18)
AST SERPL-CCNC: 22 U/L (ref 15–37)
BASOPHILS # BLD AUTO: 0.03 X10(3) UL (ref 0–0.2)
BASOPHILS NFR BLD AUTO: 0.5 %
BILIRUB SERPL-MCNC: 0.5 MG/DL (ref 0.1–2)
BUN BLD-MCNC: 19 MG/DL (ref 7–18)
CALCIUM BLD-MCNC: 9.8 MG/DL (ref 8.5–10.1)
CHLORIDE SERPL-SCNC: 105 MMOL/L (ref 98–112)
CHOLEST SERPL-MCNC: 148 MG/DL (ref ?–200)
CO2 SERPL-SCNC: 31 MMOL/L (ref 21–32)
CREAT BLD-MCNC: 0.89 MG/DL
EOSINOPHIL # BLD AUTO: 0.11 X10(3) UL (ref 0–0.7)
EOSINOPHIL NFR BLD AUTO: 1.9 %
ERYTHROCYTE [DISTWIDTH] IN BLOOD BY AUTOMATED COUNT: 11.6 %
EST. AVERAGE GLUCOSE BLD GHB EST-MCNC: 105 MG/DL (ref 68–126)
FASTING PATIENT LIPID ANSWER: YES
FASTING STATUS PATIENT QL REPORTED: YES
GLOBULIN PLAS-MCNC: 3.6 G/DL (ref 2.8–4.4)
GLUCOSE BLD-MCNC: 104 MG/DL (ref 70–99)
HBA1C MFR BLD: 5.3 % (ref ?–5.7)
HCT VFR BLD AUTO: 46.3 %
HDLC SERPL-MCNC: 38 MG/DL (ref 40–59)
HGB BLD-MCNC: 15.4 G/DL
IMM GRANULOCYTES # BLD AUTO: 0.02 X10(3) UL (ref 0–1)
IMM GRANULOCYTES NFR BLD: 0.3 %
LDLC SERPL CALC-MCNC: 76 MG/DL (ref ?–100)
LYMPHOCYTES # BLD AUTO: 1.23 X10(3) UL (ref 1–4)
LYMPHOCYTES NFR BLD AUTO: 20.8 %
MCH RBC QN AUTO: 30 PG (ref 26–34)
MCHC RBC AUTO-ENTMCNC: 33.3 G/DL (ref 31–37)
MCV RBC AUTO: 90.1 FL
MONOCYTES # BLD AUTO: 0.44 X10(3) UL (ref 0.1–1)
MONOCYTES NFR BLD AUTO: 7.5 %
NEUTROPHILS # BLD AUTO: 4.07 X10 (3) UL (ref 1.5–7.7)
NEUTROPHILS # BLD AUTO: 4.07 X10(3) UL (ref 1.5–7.7)
NEUTROPHILS NFR BLD AUTO: 69 %
NONHDLC SERPL-MCNC: 110 MG/DL (ref ?–130)
OSMOLALITY SERPL CALC.SUM OF ELEC: 287 MOSM/KG (ref 275–295)
PLATELET # BLD AUTO: 287 10(3)UL (ref 150–450)
POTASSIUM SERPL-SCNC: 4.2 MMOL/L (ref 3.5–5.1)
PROT SERPL-MCNC: 8.2 G/DL (ref 6.4–8.2)
RBC # BLD AUTO: 5.14 X10(6)UL
SODIUM SERPL-SCNC: 137 MMOL/L (ref 136–145)
T PALLIDUM AB SER QL IA: NONREACTIVE
TRIGL SERPL-MCNC: 205 MG/DL (ref 30–149)
VLDLC SERPL CALC-MCNC: 32 MG/DL (ref 0–30)
WBC # BLD AUTO: 5.9 X10(3) UL (ref 4–11)

## 2022-07-19 PROCEDURE — 99385 PREV VISIT NEW AGE 18-39: CPT | Performed by: STUDENT IN AN ORGANIZED HEALTH CARE EDUCATION/TRAINING PROGRAM

## 2022-07-19 PROCEDURE — 3008F BODY MASS INDEX DOCD: CPT | Performed by: STUDENT IN AN ORGANIZED HEALTH CARE EDUCATION/TRAINING PROGRAM

## 2022-07-19 PROCEDURE — 80061 LIPID PANEL: CPT | Performed by: STUDENT IN AN ORGANIZED HEALTH CARE EDUCATION/TRAINING PROGRAM

## 2022-07-19 PROCEDURE — 3074F SYST BP LT 130 MM HG: CPT | Performed by: STUDENT IN AN ORGANIZED HEALTH CARE EDUCATION/TRAINING PROGRAM

## 2022-07-19 PROCEDURE — 80053 COMPREHEN METABOLIC PANEL: CPT | Performed by: STUDENT IN AN ORGANIZED HEALTH CARE EDUCATION/TRAINING PROGRAM

## 2022-07-19 PROCEDURE — 86780 TREPONEMA PALLIDUM: CPT | Performed by: STUDENT IN AN ORGANIZED HEALTH CARE EDUCATION/TRAINING PROGRAM

## 2022-07-19 PROCEDURE — 85025 COMPLETE CBC W/AUTO DIFF WBC: CPT | Performed by: STUDENT IN AN ORGANIZED HEALTH CARE EDUCATION/TRAINING PROGRAM

## 2022-07-19 PROCEDURE — 3078F DIAST BP <80 MM HG: CPT | Performed by: STUDENT IN AN ORGANIZED HEALTH CARE EDUCATION/TRAINING PROGRAM

## 2022-07-19 PROCEDURE — 83036 HEMOGLOBIN GLYCOSYLATED A1C: CPT | Performed by: STUDENT IN AN ORGANIZED HEALTH CARE EDUCATION/TRAINING PROGRAM

## 2022-07-19 PROCEDURE — 87491 CHLMYD TRACH DNA AMP PROBE: CPT | Performed by: STUDENT IN AN ORGANIZED HEALTH CARE EDUCATION/TRAINING PROGRAM

## 2022-07-19 PROCEDURE — 87591 N.GONORRHOEAE DNA AMP PROB: CPT | Performed by: STUDENT IN AN ORGANIZED HEALTH CARE EDUCATION/TRAINING PROGRAM

## 2022-07-19 PROCEDURE — 87389 HIV-1 AG W/HIV-1&-2 AB AG IA: CPT | Performed by: STUDENT IN AN ORGANIZED HEALTH CARE EDUCATION/TRAINING PROGRAM

## 2022-07-20 LAB
C TRACH DNA SPEC QL NAA+PROBE: NEGATIVE
N GONORRHOEA DNA SPEC QL NAA+PROBE: NEGATIVE

## 2023-08-08 ENCOUNTER — OFFICE VISIT (OUTPATIENT)
Dept: FAMILY MEDICINE CLINIC | Facility: CLINIC | Age: 28
End: 2023-08-08
Payer: COMMERCIAL

## 2023-08-08 VITALS
WEIGHT: 140 LBS | RESPIRATION RATE: 18 BRPM | OXYGEN SATURATION: 98 % | HEART RATE: 90 BPM | BODY MASS INDEX: 22.5 KG/M2 | HEIGHT: 66 IN | SYSTOLIC BLOOD PRESSURE: 120 MMHG | DIASTOLIC BLOOD PRESSURE: 80 MMHG

## 2023-08-08 DIAGNOSIS — Z00.00 LABORATORY EXAMINATION ORDERED AS PART OF A ROUTINE GENERAL MEDICAL EXAMINATION: ICD-10-CM

## 2023-08-08 DIAGNOSIS — Z23 NEED FOR VACCINATION: ICD-10-CM

## 2023-08-08 DIAGNOSIS — Z11.3 SCREEN FOR STD (SEXUALLY TRANSMITTED DISEASE): ICD-10-CM

## 2023-08-08 DIAGNOSIS — Z00.00 ANNUAL PHYSICAL EXAM: Primary | ICD-10-CM

## 2023-08-08 LAB
ALBUMIN SERPL-MCNC: 4.5 G/DL (ref 3.4–5)
ALBUMIN/GLOB SERPL: 1.1 {RATIO} (ref 1–2)
ALP LIVER SERPL-CCNC: 63 U/L
ALT SERPL-CCNC: 30 U/L
ANION GAP SERPL CALC-SCNC: 3 MMOL/L (ref 0–18)
AST SERPL-CCNC: 21 U/L (ref 15–37)
BASOPHILS # BLD AUTO: 0.06 X10(3) UL (ref 0–0.2)
BASOPHILS NFR BLD AUTO: 0.8 %
BILIRUB SERPL-MCNC: 0.6 MG/DL (ref 0.1–2)
BUN BLD-MCNC: 19 MG/DL (ref 7–18)
CALCIUM BLD-MCNC: 9.5 MG/DL (ref 8.5–10.1)
CHLORIDE SERPL-SCNC: 106 MMOL/L (ref 98–112)
CHOLEST SERPL-MCNC: 151 MG/DL (ref ?–200)
CO2 SERPL-SCNC: 28 MMOL/L (ref 21–32)
CREAT BLD-MCNC: 1.07 MG/DL
EGFRCR SERPLBLD CKD-EPI 2021: 98 ML/MIN/1.73M2 (ref 60–?)
EOSINOPHIL # BLD AUTO: 0.11 X10(3) UL (ref 0–0.7)
EOSINOPHIL NFR BLD AUTO: 1.5 %
ERYTHROCYTE [DISTWIDTH] IN BLOOD BY AUTOMATED COUNT: 11.9 %
EST. AVERAGE GLUCOSE BLD GHB EST-MCNC: 120 MG/DL (ref 68–126)
FASTING PATIENT LIPID ANSWER: NO
FASTING STATUS PATIENT QL REPORTED: NO
GLOBULIN PLAS-MCNC: 4 G/DL (ref 2.8–4.4)
GLUCOSE BLD-MCNC: 96 MG/DL (ref 70–99)
HBA1C MFR BLD: 5.8 % (ref ?–5.7)
HCT VFR BLD AUTO: 48 %
HDLC SERPL-MCNC: 41 MG/DL (ref 40–59)
HGB BLD-MCNC: 16.1 G/DL
IMM GRANULOCYTES # BLD AUTO: 0.02 X10(3) UL (ref 0–1)
IMM GRANULOCYTES NFR BLD: 0.3 %
LDLC SERPL CALC-MCNC: 69 MG/DL (ref ?–100)
LYMPHOCYTES # BLD AUTO: 1.75 X10(3) UL (ref 1–4)
LYMPHOCYTES NFR BLD AUTO: 23.1 %
MCH RBC QN AUTO: 29.3 PG (ref 26–34)
MCHC RBC AUTO-ENTMCNC: 33.5 G/DL (ref 31–37)
MCV RBC AUTO: 87.4 FL
MONOCYTES # BLD AUTO: 0.62 X10(3) UL (ref 0.1–1)
MONOCYTES NFR BLD AUTO: 8.2 %
NEUTROPHILS # BLD AUTO: 5.01 X10 (3) UL (ref 1.5–7.7)
NEUTROPHILS # BLD AUTO: 5.01 X10(3) UL (ref 1.5–7.7)
NEUTROPHILS NFR BLD AUTO: 66.1 %
NONHDLC SERPL-MCNC: 110 MG/DL (ref ?–130)
OSMOLALITY SERPL CALC.SUM OF ELEC: 286 MOSM/KG (ref 275–295)
PLATELET # BLD AUTO: 322 10(3)UL (ref 150–450)
POTASSIUM SERPL-SCNC: 3.8 MMOL/L (ref 3.5–5.1)
PROT SERPL-MCNC: 8.5 G/DL (ref 6.4–8.2)
RBC # BLD AUTO: 5.49 X10(6)UL
SODIUM SERPL-SCNC: 137 MMOL/L (ref 136–145)
T PALLIDUM AB SER QL IA: NONREACTIVE
TRIGL SERPL-MCNC: 252 MG/DL (ref 30–149)
TSI SER-ACNC: 1.6 MIU/ML (ref 0.36–3.74)
VLDLC SERPL CALC-MCNC: 38 MG/DL (ref 0–30)
WBC # BLD AUTO: 7.6 X10(3) UL (ref 4–11)

## 2023-08-08 PROCEDURE — 83036 HEMOGLOBIN GLYCOSYLATED A1C: CPT | Performed by: STUDENT IN AN ORGANIZED HEALTH CARE EDUCATION/TRAINING PROGRAM

## 2023-08-08 PROCEDURE — 90715 TDAP VACCINE 7 YRS/> IM: CPT | Performed by: STUDENT IN AN ORGANIZED HEALTH CARE EDUCATION/TRAINING PROGRAM

## 2023-08-08 PROCEDURE — 99395 PREV VISIT EST AGE 18-39: CPT | Performed by: STUDENT IN AN ORGANIZED HEALTH CARE EDUCATION/TRAINING PROGRAM

## 2023-08-08 PROCEDURE — 87389 HIV-1 AG W/HIV-1&-2 AB AG IA: CPT | Performed by: STUDENT IN AN ORGANIZED HEALTH CARE EDUCATION/TRAINING PROGRAM

## 2023-08-08 PROCEDURE — 3008F BODY MASS INDEX DOCD: CPT | Performed by: STUDENT IN AN ORGANIZED HEALTH CARE EDUCATION/TRAINING PROGRAM

## 2023-08-08 PROCEDURE — 90651 9VHPV VACCINE 2/3 DOSE IM: CPT | Performed by: STUDENT IN AN ORGANIZED HEALTH CARE EDUCATION/TRAINING PROGRAM

## 2023-08-08 PROCEDURE — 90471 IMMUNIZATION ADMIN: CPT | Performed by: STUDENT IN AN ORGANIZED HEALTH CARE EDUCATION/TRAINING PROGRAM

## 2023-08-08 PROCEDURE — 3074F SYST BP LT 130 MM HG: CPT | Performed by: STUDENT IN AN ORGANIZED HEALTH CARE EDUCATION/TRAINING PROGRAM

## 2023-08-08 PROCEDURE — 3079F DIAST BP 80-89 MM HG: CPT | Performed by: STUDENT IN AN ORGANIZED HEALTH CARE EDUCATION/TRAINING PROGRAM

## 2023-08-08 PROCEDURE — 80061 LIPID PANEL: CPT | Performed by: STUDENT IN AN ORGANIZED HEALTH CARE EDUCATION/TRAINING PROGRAM

## 2023-08-08 PROCEDURE — 87591 N.GONORRHOEAE DNA AMP PROB: CPT | Performed by: STUDENT IN AN ORGANIZED HEALTH CARE EDUCATION/TRAINING PROGRAM

## 2023-08-08 PROCEDURE — 90472 IMMUNIZATION ADMIN EACH ADD: CPT | Performed by: STUDENT IN AN ORGANIZED HEALTH CARE EDUCATION/TRAINING PROGRAM

## 2023-08-08 PROCEDURE — 86780 TREPONEMA PALLIDUM: CPT | Performed by: STUDENT IN AN ORGANIZED HEALTH CARE EDUCATION/TRAINING PROGRAM

## 2023-08-08 PROCEDURE — 87491 CHLMYD TRACH DNA AMP PROBE: CPT | Performed by: STUDENT IN AN ORGANIZED HEALTH CARE EDUCATION/TRAINING PROGRAM

## 2023-08-08 PROCEDURE — 80050 GENERAL HEALTH PANEL: CPT | Performed by: STUDENT IN AN ORGANIZED HEALTH CARE EDUCATION/TRAINING PROGRAM

## 2023-08-09 LAB
C TRACH DNA SPEC QL NAA+PROBE: NEGATIVE
N GONORRHOEA DNA SPEC QL NAA+PROBE: NEGATIVE

## 2023-08-24 ENCOUNTER — PATIENT MESSAGE (OUTPATIENT)
Dept: ORTHOPEDICS CLINIC | Facility: CLINIC | Age: 28
End: 2023-08-24

## 2023-08-24 NOTE — TELEPHONE ENCOUNTER
From: Theresa Garcia  To: Bailey Fink MD  Sent: 8/24/2023 3:00 AM CDT  Subject: Wrist problem again    Hi, I've been missing work again do to wrist pain again. Is it alright to see you for a doctor's note soon and check up on it?

## 2023-08-25 NOTE — TELEPHONE ENCOUNTER
Patient last seen on 4/19/2022. Please see patients messages and advise if it is appropriate to give him a work note since you haven't examined this patient in over a year. Patient is scheduled for a visit with you on 9/26/23. Thanks.

## 2023-08-28 ENCOUNTER — OFFICE VISIT (OUTPATIENT)
Dept: FAMILY MEDICINE CLINIC | Facility: CLINIC | Age: 28
End: 2023-08-28
Payer: COMMERCIAL

## 2023-08-28 VITALS
WEIGHT: 169 LBS | SYSTOLIC BLOOD PRESSURE: 120 MMHG | HEIGHT: 66 IN | RESPIRATION RATE: 16 BRPM | DIASTOLIC BLOOD PRESSURE: 78 MMHG | BODY MASS INDEX: 27.16 KG/M2 | HEART RATE: 80 BPM | OXYGEN SATURATION: 98 %

## 2023-08-28 DIAGNOSIS — M77.8 LEFT WRIST TENDONITIS: ICD-10-CM

## 2023-08-28 DIAGNOSIS — K21.9 GASTROESOPHAGEAL REFLUX DISEASE, UNSPECIFIED WHETHER ESOPHAGITIS PRESENT: ICD-10-CM

## 2023-08-28 DIAGNOSIS — R05.3 CHRONIC COUGH: Primary | ICD-10-CM

## 2023-08-28 PROCEDURE — 3008F BODY MASS INDEX DOCD: CPT | Performed by: STUDENT IN AN ORGANIZED HEALTH CARE EDUCATION/TRAINING PROGRAM

## 2023-08-28 PROCEDURE — 3074F SYST BP LT 130 MM HG: CPT | Performed by: STUDENT IN AN ORGANIZED HEALTH CARE EDUCATION/TRAINING PROGRAM

## 2023-08-28 PROCEDURE — 3078F DIAST BP <80 MM HG: CPT | Performed by: STUDENT IN AN ORGANIZED HEALTH CARE EDUCATION/TRAINING PROGRAM

## 2023-08-28 PROCEDURE — 99214 OFFICE O/P EST MOD 30 MIN: CPT | Performed by: STUDENT IN AN ORGANIZED HEALTH CARE EDUCATION/TRAINING PROGRAM

## 2023-08-28 RX ORDER — PANTOPRAZOLE SODIUM 40 MG/1
40 TABLET, DELAYED RELEASE ORAL
Qty: 30 TABLET | Refills: 0 | Status: SHIPPED | OUTPATIENT
Start: 2023-08-28

## 2023-08-28 RX ORDER — ALBUTEROL SULFATE 90 UG/1
2 AEROSOL, METERED RESPIRATORY (INHALATION) EVERY 6 HOURS PRN
Qty: 8 G | Refills: 0 | Status: SHIPPED | OUTPATIENT
Start: 2023-08-28

## 2023-08-28 RX ORDER — ALBUTEROL SULFATE 90 UG/1
2 AEROSOL, METERED RESPIRATORY (INHALATION) EVERY 6 HOURS PRN
Qty: 18 G | Refills: 0 | Status: SHIPPED | OUTPATIENT
Start: 2023-08-28 | End: 2023-08-28

## 2023-08-28 RX ORDER — METHYLPREDNISOLONE 4 MG/1
TABLET ORAL
Qty: 21 EACH | Refills: 0 | Status: SHIPPED | OUTPATIENT
Start: 2023-08-28

## 2023-09-24 DIAGNOSIS — K21.9 GASTROESOPHAGEAL REFLUX DISEASE, UNSPECIFIED WHETHER ESOPHAGITIS PRESENT: ICD-10-CM

## 2023-09-24 RX ORDER — PANTOPRAZOLE SODIUM 40 MG/1
40 TABLET, DELAYED RELEASE ORAL
Qty: 30 TABLET | Refills: 0 | Status: SHIPPED | OUTPATIENT
Start: 2023-09-24

## 2023-09-26 ENCOUNTER — OFFICE VISIT (OUTPATIENT)
Dept: ORTHOPEDICS CLINIC | Facility: CLINIC | Age: 28
End: 2023-09-26

## 2023-09-26 VITALS — BODY MASS INDEX: 27.16 KG/M2 | HEIGHT: 66 IN | WEIGHT: 169 LBS

## 2023-09-26 DIAGNOSIS — G56.02 CARPAL TUNNEL SYNDROME OF LEFT WRIST: Primary | ICD-10-CM

## 2023-09-26 DIAGNOSIS — G56.22 CUBITAL TUNNEL SYNDROME ON LEFT: ICD-10-CM

## 2023-09-26 PROCEDURE — 99214 OFFICE O/P EST MOD 30 MIN: CPT | Performed by: ORTHOPAEDIC SURGERY

## 2023-09-26 PROCEDURE — 3008F BODY MASS INDEX DOCD: CPT | Performed by: ORTHOPAEDIC SURGERY

## 2023-10-04 ENCOUNTER — TELEPHONE (OUTPATIENT)
Dept: ORTHOPEDICS CLINIC | Facility: CLINIC | Age: 28
End: 2023-10-04

## 2023-10-04 NOTE — TELEPHONE ENCOUNTER
FMLA received via email by patient- logged for processing- [employer] message sent for auth

## 2023-10-19 NOTE — TELEPHONE ENCOUNTER
Dr. Henrry Rose,     *The ACKNOWLEDGE button has been moved to the top right ribbon*    Please sign off on form if you agree to:  Continuous FMLA (wrist/elbow issue), start date: 8/7/23, end date: pending re-eval on 11/20/23   (place your signature on the first page only)    -From your Inbasket, Highlight the patient and click Chart   -Double click the 79/3/74 Forms Completion telephone encounter  -Tyesha Hutchins down to the Media section   -Click the blue Hyperlink:  FMLA Dr. Henrry Rose 54/16/33  -Click Acknowledge located in the top right ribbon/menu   -Drag the mouse into the blank space of the document and a + sign will appear. Left click to   electronically sign the document. Thank you,    Estrada Smith.

## 2023-11-16 ENCOUNTER — PATIENT MESSAGE (OUTPATIENT)
Dept: ORTHOPEDICS CLINIC | Facility: CLINIC | Age: 28
End: 2023-11-16

## 2023-11-20 ENCOUNTER — OFFICE VISIT (OUTPATIENT)
Dept: ORTHOPEDICS CLINIC | Facility: CLINIC | Age: 28
End: 2023-11-20
Payer: COMMERCIAL

## 2023-11-20 VITALS — HEIGHT: 66 IN | BODY MASS INDEX: 27.16 KG/M2 | WEIGHT: 169 LBS

## 2023-11-20 DIAGNOSIS — G56.02 CARPAL TUNNEL SYNDROME OF LEFT WRIST: Primary | ICD-10-CM

## 2023-11-20 DIAGNOSIS — G56.22 CUBITAL TUNNEL SYNDROME ON LEFT: ICD-10-CM

## 2023-11-20 PROCEDURE — 99212 OFFICE O/P EST SF 10 MIN: CPT | Performed by: ORTHOPAEDIC SURGERY

## 2023-11-20 PROCEDURE — 3008F BODY MASS INDEX DOCD: CPT | Performed by: ORTHOPAEDIC SURGERY

## 2024-04-15 ENCOUNTER — OFFICE VISIT (OUTPATIENT)
Dept: FAMILY MEDICINE CLINIC | Facility: CLINIC | Age: 29
End: 2024-04-15
Payer: COMMERCIAL

## 2024-04-15 VITALS
HEIGHT: 66 IN | WEIGHT: 166 LBS | OXYGEN SATURATION: 97 % | TEMPERATURE: 98 F | BODY MASS INDEX: 26.68 KG/M2 | DIASTOLIC BLOOD PRESSURE: 80 MMHG | SYSTOLIC BLOOD PRESSURE: 110 MMHG | RESPIRATION RATE: 18 BRPM | HEART RATE: 99 BPM

## 2024-04-15 DIAGNOSIS — J02.9 SORE THROAT: ICD-10-CM

## 2024-04-15 DIAGNOSIS — J06.9 URI, ACUTE: Primary | ICD-10-CM

## 2024-04-15 DIAGNOSIS — Z30.09 VASECTOMY EVALUATION: ICD-10-CM

## 2024-04-15 DIAGNOSIS — G47.00 INSOMNIA, UNSPECIFIED TYPE: ICD-10-CM

## 2024-04-15 DIAGNOSIS — M77.12 LATERAL EPICONDYLITIS OF LEFT ELBOW: ICD-10-CM

## 2024-04-15 DIAGNOSIS — G56.02 CARPAL TUNNEL SYNDROME OF LEFT WRIST: ICD-10-CM

## 2024-04-15 LAB
CONTROL LINE PRESENT WITH A CLEAR BACKGROUND (YES/NO): YES YES/NO
KIT LOT #: NORMAL NUMERIC
STREP GRP A CUL-SCR: NEGATIVE

## 2024-04-15 PROCEDURE — 87637 SARSCOV2&INF A&B&RSV AMP PRB: CPT | Performed by: STUDENT IN AN ORGANIZED HEALTH CARE EDUCATION/TRAINING PROGRAM

## 2024-04-15 RX ORDER — METHYLPREDNISOLONE 4 MG/1
TABLET ORAL
Qty: 21 EACH | Refills: 0 | Status: SHIPPED | OUTPATIENT
Start: 2024-04-15

## 2024-04-15 RX ORDER — FLUTICASONE PROPIONATE 50 MCG
2 SPRAY, SUSPENSION (ML) NASAL DAILY PRN
Qty: 16 G | Refills: 0 | Status: SHIPPED | OUTPATIENT
Start: 2024-04-15

## 2024-04-15 RX ORDER — AZELASTINE 1 MG/ML
2 SPRAY, METERED NASAL NIGHTLY PRN
Qty: 30 ML | Refills: 0 | Status: SHIPPED | OUTPATIENT
Start: 2024-04-15

## 2024-04-15 NOTE — PROGRESS NOTES
[facility] Family Medicine Office Note    HPI:     Darren Ly is a 28 year old male presenting for multiple issues noted below.     URI symptoms  Patient developed cough (phlegm associated that is greenish white), headaches, sore throat, and apparently fever about 4 days ago. Did not take his temperature at home. Denies sick contacts. [place] [employer].     Wrist pain, Elbow pain  Endorses wrist pain that started 2 weeks ago, described as sharp. Has been using wrist brace at home every other day. 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 Apparently resolved in past with bracing after seeing hand specialist. Patent also endorse left elbow pain that began about 1.5 weeks ago, radiate down left forearm at times. Pains can randomly occur.     Insomnia  Patient states he has had difficulty with sleep ongoing for about 2 years. States he has difficulty with falling and staying asleep. Wakes up randomly in middle of night, sometimes with SOB. Has been told in past he has had issues with snoring and his current partner endorses that at times at night he seems to \"stop breathing.\"    Vasectomy referral   Patient would like referral for vasectomy.     HISTORY:  History reviewed. No pertinent past medical history.   History reviewed. No pertinent surgical history.   Family History   Problem Relation Age of Onset    Cancer Father     Diabetes Father       Social History:   Social History     Socioeconomic History    Marital status: Single   Tobacco Use    Smoking status: Never     Passive exposure: Never    Smokeless tobacco: Never   Vaping Use    Vaping status: Never Used   Substance and Sexual Activity    Alcohol use: Never    Drug use: Never    Sexual activity: Yes     Social Determinants of Health      [facility]    Social Connections    Received from [facility], [facility]  [facility]    Housing Stability        Medications (Active prior to today's visit):  Current Outpatient Medications   Medication Sig Dispense Refill    methylPREDNISolone (MEDROL) 4 MG Oral Tablet Therapy Pack As directed. 21 each 0    fluticasone propionate 50 MCG/ACT Nasal Suspension 2 sprays by Each Nare route daily as needed (for sinus congestion). 16 g 0    azelastine 0.1 % Nasal Solution 2 sprays by Nasal route nightly as needed (for sinus congestion). 30 mL 0       Allergies:  No Known Allergies      ROS:   Review of Systems   HENT:  Positive for congestion and sore throat.    Respiratory:  Positive for cough.    Musculoskeletal:         +left wrist and elbow pain   Psychiatric/Behavioral:  The patient has insomnia.      Otherwise see HPI    PHYSICAL EXAM:   /80 (BP Location: Left arm, Patient Position: Sitting, Cuff Size: adult)   Pulse 99   Temp 98.2 °F (36.8 °C)   Resp 18   Ht 5' 6\" (1.676 m)   Wt 166 lb (75.3 kg)   SpO2 97%   BMI 26.79 kg/m²  Estimated body mass index is 26.79 kg/m² as calculated from the following:    Height as of this encounter: 5' 6\" (1.676 m).    Weight as of this encounter: 166 lb (75.3 kg).   Vital signs reviewed.Appears stated age, well groomed.    Physical Exam  Constitutional:       General: He is not in acute distress.  HENT:      Nose: Congestion present.      Comments: +no significant sinus tenderness to palpation     Mouth/Throat:      Comments: +approximately 2+ tonsillar enlargement bilaterally, no exudate appreciated  Pulmonary:      Effort: Pulmonary effort is normal.      Breath sounds: Normal breath sounds. No wheezing, rhonchi or rales.   Musculoskeletal:      Comments: +mild tenderness to palpation of left wrist, ROM intact  +mild tenderness to palpation of lateral left elbow, ROM intact, positive [place] test   Neurological:      Mental Status: He is alert.           ASSESSMENT/PLAN:     28 year old male presenting for       1. URI, acute  -  SARS-CoV-2/Flu A and B/RSV by PCR ([place]); Future  - rapid strep negative  - Recommended adequate hydration, humidifier use, honey/lemon mixture, tylenol/ibuprofen PRN, etc.   - fluticasone propionate 50 MCG/ACT Nasal Suspension; 2 sprays by Each Nare route daily as needed (for sinus congestion).  Dispense: 16 g; Refill: 0  - azelastine 0.1 % Nasal Solution; 2 sprays by Nasal route nightly as needed (for sinus congestion).  Dispense: 30 mL; Refill: 0  - may need FMLA paperwork completed, patient to drop off said paperwork    2. Sore throat  - Rapid Strep (negative)    3. Carpal tunnel syndrome of left wrist  - may likely be cause of current left wrist pain, may also have element of wrist tendonitis  - trial medrol, use brace at home more consistently, continue prior physical therapy exercises  - methylPREDNISolone (MEDROL) 4 MG Oral Tablet Therapy Pack; As directed.  Dispense: 21 each; Refill: 0  - if no improvement with above f/u with ortho hand specialist again    4. Lateral epicondylitis of left elbow  - provided handout with home physical therapy exercises   - methylPREDNISolone (MEDROL) 4 MG Oral Tablet Therapy Pack; As directed.  Dispense: 21 each; Refill: 0  - if no improvement with above f/u with ortho hand specialist again    5. Insomnia, unspecified type  - provided referral for sleep study     6. Vasectomy evaluation  - Urology Referral - In Network    Follow-up: as needed    Outcome: Patient verbalizes understanding. Patient is notified to call with any questions, complications, allergies, or worsening or changing symptoms.  Patient is to call with any side effects or complications from the treatments as a result of today.     Total length of visit/charting: approximately 40 min    Getachew Gonsales MD, 04/15/24, 1:46 PM      Please note that portions of this note may have been completed with a voice recognition program. Efforts were made to edit the dictations but occasionally words are  mis-transcribed.

## 2024-04-16 LAB
FLUAV + FLUBV RNA SPEC NAA+PROBE: NOT DETECTED
FLUAV + FLUBV RNA SPEC NAA+PROBE: NOT DETECTED
RSV RNA SPEC NAA+PROBE: NOT DETECTED
SARS-COV-2 RNA RESP QL NAA+PROBE: NOT DETECTED

## 2024-04-27 ENCOUNTER — PATIENT MESSAGE (OUTPATIENT)
Dept: FAMILY MEDICINE CLINIC | Facility: CLINIC | Age: 29
End: 2024-04-27

## 2024-04-29 NOTE — TELEPHONE ENCOUNTER
From: Darren Ly  To: Getachew Gonsales  Sent: 4/27/2024 12:42 AM CDT  Subject: Sickness    Had to take additional days off due to medication causing really bad headaches. I am trying to drop off new [place] paper work to return to work. Last day for the medicine use is 4/27/2024

## 2024-04-29 NOTE — TELEPHONE ENCOUNTER
Pt dropped off FMLA paperwork.  Advised patient to ask HR if the whole package needs to be filled out again or if just last page - Return to Work Form.  Patient will check and let us know.    Patient aware he may need another appt or if not, there will be a $25 form fee.    Patient also aware Dr. Gonsales out of office till May 5, 2024.    Placed in Dr. Gonsales's in box.

## 2024-05-09 ENCOUNTER — MED REC SCAN ONLY (OUTPATIENT)
Dept: FAMILY MEDICINE CLINIC | Facility: CLINIC | Age: 29
End: 2024-05-09

## 2024-07-08 ENCOUNTER — OFFICE VISIT (OUTPATIENT)
Dept: FAMILY MEDICINE CLINIC | Facility: CLINIC | Age: 29
End: 2024-07-08
Payer: COMMERCIAL

## 2024-07-08 VITALS
HEIGHT: 66 IN | WEIGHT: 166 LBS | DIASTOLIC BLOOD PRESSURE: 70 MMHG | RESPIRATION RATE: 18 BRPM | SYSTOLIC BLOOD PRESSURE: 119 MMHG | OXYGEN SATURATION: 99 % | BODY MASS INDEX: 26.68 KG/M2 | HEART RATE: 96 BPM

## 2024-07-08 DIAGNOSIS — M77.12 LATERAL EPICONDYLITIS OF LEFT ELBOW: ICD-10-CM

## 2024-07-08 DIAGNOSIS — M25.551 RIGHT HIP PAIN: Primary | ICD-10-CM

## 2024-07-08 PROCEDURE — 3074F SYST BP LT 130 MM HG: CPT | Performed by: STUDENT IN AN ORGANIZED HEALTH CARE EDUCATION/TRAINING PROGRAM

## 2024-07-08 PROCEDURE — 99214 OFFICE O/P EST MOD 30 MIN: CPT | Performed by: STUDENT IN AN ORGANIZED HEALTH CARE EDUCATION/TRAINING PROGRAM

## 2024-07-08 PROCEDURE — 3008F BODY MASS INDEX DOCD: CPT | Performed by: STUDENT IN AN ORGANIZED HEALTH CARE EDUCATION/TRAINING PROGRAM

## 2024-07-08 PROCEDURE — 3078F DIAST BP <80 MM HG: CPT | Performed by: STUDENT IN AN ORGANIZED HEALTH CARE EDUCATION/TRAINING PROGRAM

## 2024-07-08 NOTE — PROGRESS NOTES
[facility] Family Medicine Office Note    HPI:     Darren Ly is a 28 year old male presenting for right hip pain and continued issues with left elbow pain.     Hip pain on left started after falling while skateboarding on June 15th. Apparently his right hip \"stretched outwards\" during the fall and he states he heard a \"crack.\" Hip pain located in right hip from inguinal region to lateral right hip. Pain occurs relatively consistently with sitting but only off/on when walking (particularly when placing more pressure on leg). Pain seems to happen more when sitting for prolonged period of time. Described as dull pain. Can be limiting, particularly as he has to walk often and sometimes sit for a while at work. May have sometimes noted numbness but denies weakness or tingling. Denies bowel or bladder incontinence. Lying down can help improve the pain.     Left elbow pain apparnetly improved somehwat after medrol and home physical therapy exercises but still bothering patient. Has seen ortho specialist in past but did not f/u yet.     HISTORY:  History reviewed. No pertinent past medical history.   History reviewed. No pertinent surgical history.   Family History   Problem Relation Age of Onset    Cancer Father     Diabetes Father       Social History:   Social History     Socioeconomic History    Marital status: Single   Tobacco Use    Smoking status: Never     Passive exposure: Never    Smokeless tobacco: Never   Vaping Use    Vaping status: Never Used   Substance and Sexual Activity    Alcohol use: Never    Drug use: Never    Sexual activity: Yes     Social Determinants of Health      Received from [facility], [facility]    Social Connections    Received from [facility], [facility]    Housing Stability        Medications (Active prior to today's visit):  No current outpatient medications on file.        Allergies:  No Known Allergies      ROS:   Review of Systems   Musculoskeletal:         +right hip pain  +chronic left elbow pain     Otherwise see HPI    PHYSICAL EXAM:   /70 (BP Location: Left arm, Patient Position: Sitting, Cuff Size: adult)   Pulse 96   Resp 18   Ht 5' 6\" (1.676 m)   Wt 166 lb (75.3 kg)   SpO2 99%   BMI 26.79 kg/m²  Estimated body mass index is 26.79 kg/m² as calculated from the following:    Height as of this encounter: 5' 6\" (1.676 m).    Weight as of this encounter: 166 lb (75.3 kg).   Vital signs reviewed.Appears stated age, well groomed.    Physical Exam  Constitutional:       General: He is not in acute distress.  Musculoskeletal:      Comments: +mild tenderness noted in right hip region with external rotation of right leg on log roll test  +increased tenderness in right hip with compression test at foot and knee  +increased pain in right hip with hip flexion  +positive MADI but negative FADIR  +antalgic gait on right   Neurological:      Mental Status: He is alert.           ASSESSMENT/PLAN:     28 year old male presenting for right hip pain and left elbow pain.     1. Right hip pain  - given persistence of pain and exam findings recommend x-ray imaging below  - XR HIP + PELVIS MIN 4 VIEWS RIGHT (CPT=73503); Future  - if x-ray unremarkable may be hip tendonitis, sacroiliitis, or hip labrum, provided home physical therapy exercises     2. Lateral epicondylitis of left elbow  - see prior note on 4/15/24 for more info on this condition, given persistence of pain recommend f/u with ortho specialist   - Ortho Referral - In Network    Follow-up: as needed    Outcome: Patient verbalizes understanding. Patient is notified to call with any questions, complications, allergies, or worsening or changing symptoms.  Patient is to call with any side effects or complications from the treatments as a result of today.     Total length of visit/charting: approximately 30 min    Getachwe  MD Dru, 07/08/24, 4:34 PM      Please note that portions of this note may have been completed with a voice recognition program. Efforts were made to edit the dictations but occasionally words are mis-transcribed.

## 2024-07-11 ENCOUNTER — TELEPHONE (OUTPATIENT)
Dept: FAMILY MEDICINE CLINIC | Facility: CLINIC | Age: 29
End: 2024-07-11

## 2024-07-11 NOTE — TELEPHONE ENCOUNTER
Patient dropped off [facility] paperwok for [employer] placed in Dr. Gonsales inbox.

## 2025-04-07 ENCOUNTER — OFFICE VISIT (OUTPATIENT)
Dept: FAMILY MEDICINE CLINIC | Facility: CLINIC | Age: 30
End: 2025-04-07
Payer: COMMERCIAL

## 2025-04-07 VITALS
BODY MASS INDEX: 28.28 KG/M2 | HEART RATE: 81 BPM | WEIGHT: 176 LBS | OXYGEN SATURATION: 97 % | RESPIRATION RATE: 18 BRPM | SYSTOLIC BLOOD PRESSURE: 122 MMHG | HEIGHT: 66 IN | DIASTOLIC BLOOD PRESSURE: 80 MMHG

## 2025-04-07 DIAGNOSIS — G56.02 CARPAL TUNNEL SYNDROME OF LEFT WRIST: ICD-10-CM

## 2025-04-07 DIAGNOSIS — Z30.09 VASECTOMY EVALUATION: ICD-10-CM

## 2025-04-07 DIAGNOSIS — Z00.00 ANNUAL PHYSICAL EXAM: Primary | ICD-10-CM

## 2025-04-07 DIAGNOSIS — Z00.00 LABORATORY EXAMINATION ORDERED AS PART OF A ROUTINE GENERAL MEDICAL EXAMINATION: ICD-10-CM

## 2025-04-07 LAB
ALBUMIN SERPL-MCNC: 5.1 G/DL (ref 3.2–4.8)
ALBUMIN/GLOB SERPL: 1.8 {RATIO} (ref 1–2)
ALP LIVER SERPL-CCNC: 59 U/L
ALT SERPL-CCNC: 44 U/L
ANION GAP SERPL CALC-SCNC: 9 MMOL/L (ref 0–18)
AST SERPL-CCNC: 32 U/L (ref ?–34)
BASOPHILS # BLD AUTO: 0.06 X10(3) UL (ref 0–0.2)
BASOPHILS NFR BLD AUTO: 0.9 %
BILIRUB SERPL-MCNC: 0.6 MG/DL (ref 0.3–1.2)
BUN BLD-MCNC: 11 MG/DL (ref 9–23)
CALCIUM BLD-MCNC: 10.2 MG/DL (ref 8.7–10.6)
CHLORIDE SERPL-SCNC: 104 MMOL/L (ref 98–112)
CHOLEST SERPL-MCNC: 192 MG/DL (ref ?–200)
CO2 SERPL-SCNC: 27 MMOL/L (ref 21–32)
CREAT BLD-MCNC: 0.82 MG/DL
EGFRCR SERPLBLD CKD-EPI 2021: 123 ML/MIN/1.73M2 (ref 60–?)
EOSINOPHIL # BLD AUTO: 0.36 X10(3) UL (ref 0–0.7)
EOSINOPHIL NFR BLD AUTO: 5.5 %
ERYTHROCYTE [DISTWIDTH] IN BLOOD BY AUTOMATED COUNT: 11.8 %
EST. AVERAGE GLUCOSE BLD GHB EST-MCNC: 103 MG/DL (ref 68–126)
FASTING PATIENT LIPID ANSWER: YES
FASTING STATUS PATIENT QL REPORTED: YES
GLOBULIN PLAS-MCNC: 2.9 G/DL (ref 2–3.5)
GLUCOSE BLD-MCNC: 96 MG/DL (ref 70–99)
HBA1C MFR BLD: 5.2 % (ref ?–5.7)
HCT VFR BLD AUTO: 44.3 %
HDLC SERPL-MCNC: 43 MG/DL (ref 40–59)
HGB BLD-MCNC: 15.7 G/DL
IMM GRANULOCYTES # BLD AUTO: 0.01 X10(3) UL (ref 0–1)
IMM GRANULOCYTES NFR BLD: 0.2 %
LDLC SERPL CALC-MCNC: 79 MG/DL (ref ?–100)
LYMPHOCYTES # BLD AUTO: 1.6 X10(3) UL (ref 1–4)
LYMPHOCYTES NFR BLD AUTO: 24.3 %
MCH RBC QN AUTO: 29.8 PG (ref 26–34)
MCHC RBC AUTO-ENTMCNC: 35.4 G/DL (ref 31–37)
MCV RBC AUTO: 84.2 FL
MONOCYTES # BLD AUTO: 0.54 X10(3) UL (ref 0.1–1)
MONOCYTES NFR BLD AUTO: 8.2 %
NEUTROPHILS # BLD AUTO: 4.01 X10 (3) UL (ref 1.5–7.7)
NEUTROPHILS # BLD AUTO: 4.01 X10(3) UL (ref 1.5–7.7)
NEUTROPHILS NFR BLD AUTO: 60.9 %
NONHDLC SERPL-MCNC: 149 MG/DL (ref ?–130)
OSMOLALITY SERPL CALC.SUM OF ELEC: 289 MOSM/KG (ref 275–295)
PLATELET # BLD AUTO: 317 10(3)UL (ref 150–450)
POTASSIUM SERPL-SCNC: 4.3 MMOL/L (ref 3.5–5.1)
PROT SERPL-MCNC: 8 G/DL (ref 5.7–8.2)
RBC # BLD AUTO: 5.26 X10(6)UL
SODIUM SERPL-SCNC: 140 MMOL/L (ref 136–145)
TRIGL SERPL-MCNC: 439 MG/DL (ref 30–149)
TSI SER-ACNC: 1.11 UIU/ML (ref 0.55–4.78)
VLDLC SERPL CALC-MCNC: 70 MG/DL (ref 0–30)
WBC # BLD AUTO: 6.6 X10(3) UL (ref 4–11)

## 2025-04-07 PROCEDURE — 83036 HEMOGLOBIN GLYCOSYLATED A1C: CPT | Performed by: STUDENT IN AN ORGANIZED HEALTH CARE EDUCATION/TRAINING PROGRAM

## 2025-04-07 PROCEDURE — 80061 LIPID PANEL: CPT | Performed by: STUDENT IN AN ORGANIZED HEALTH CARE EDUCATION/TRAINING PROGRAM

## 2025-04-07 PROCEDURE — 80050 GENERAL HEALTH PANEL: CPT | Performed by: STUDENT IN AN ORGANIZED HEALTH CARE EDUCATION/TRAINING PROGRAM

## 2025-04-07 NOTE — PROGRESS NOTES
[facility] Family Medicine Office Note    HPI:     Darren Ly is a 29 year old male presenting for annual exam.     He endorses chronic wrist pain in left wrist (diagnosed in past with chronic carpal tunnel syndrome of left wrist). He states due to this wrist pain he has to call off sometimes for a full week, other times more than this. He states with the wrist pain he has impaired ability to lift objects (and his job involves lifting cargo for planes). Due to random and sporadic nature of wrist pain flare up he essentially needs the ability to take off from work for up to 4 days in a week if needed to rest and recover. He is currently seeing a orthopedic specialist for wrist pain but prefers not to go down surgery route.     Diet: eating more processed foods   Exercise: lifting at work     AAA ultrasound screen? (age 65-75 with any smoking history): not indicated  Aspirin use? (age 50-59 with ASCVD risk 10% or greater): not indicated  Colonoscopy screen? (age 45-75 or earlier based on risk): not indicated  Depression screen? (PHQ-2 or PHQ-9): PHQ-2 Score of 0  Diabetes screen? (age 35 to 70 who are overweight or obese): A1c ordered  Fall Prevention? (age 65 and older): not indicated  Lipid panel? (age 20-35 with increased risk of CHD or older than 35): ordered  Lung cancer screen? (age 50-80 w/ 20 pack year smoking history and currently smoking or quit in last 15 yrs): not indicated    HISTORY:  History reviewed. No pertinent past medical history.   History reviewed. No pertinent surgical history.   Family History   Problem Relation Age of Onset    Cancer Father     Diabetes Father       Social History:   Social History     Socioeconomic History    Marital status: Single   Tobacco Use    Smoking status: Never     Passive exposure: Never    Smokeless tobacco: Never   Vaping Use    Vaping status: Never Used   Substance and Sexual Activity    Alcohol use: Never    Drug use: Never    Sexual  activity: Yes     Social Drivers of Health      Received from [facility], [facility]    Housing Stability        Medications (Active prior to today's visit):  No current outpatient medications on file.       Allergies:  Allergies[1]      ROS:   Review of Systems   Constitutional:  Negative for chills and fever.     Otherwise see HPI    PHYSICAL EXAM:   /80 (BP Location: Left arm, Patient Position: Sitting, Cuff Size: adult)   Pulse 81   Resp 18   Ht 5' 6\" (1.676 m)   Wt 176 lb (79.8 kg)   SpO2 97%   BMI 28.41 kg/m²  Estimated body mass index is 28.41 kg/m² as calculated from the following:    Height as of this encounter: 5' 6\" (1.676 m).    Weight as of this encounter: 176 lb (79.8 kg).   Vital signs reviewed.Appears stated age, well groomed.    Physical Exam  Constitutional:       General: He is not in acute distress.  HENT:      Nose: Nose normal.      Mouth/Throat:      Mouth: Mucous membranes are moist.      Pharynx: Oropharynx is clear.   Eyes:      Conjunctiva/sclera: Conjunctivae normal.      Pupils: Pupils are equal, round, and reactive to light.   Cardiovascular:      Rate and Rhythm: Normal rate and regular rhythm.      Heart sounds: Normal heart sounds.   Pulmonary:      Effort: Pulmonary effort is normal.      Breath sounds: Normal breath sounds.   Abdominal:      General: Bowel sounds are normal.      Palpations: Abdomen is soft.      Tenderness: There is no abdominal tenderness. There is no guarding or rebound.   Lymphadenopathy:      Cervical: No cervical adenopathy.   Neurological:      Mental Status: He is alert.           ASSESSMENT/PLAN:     29 year old male presenting for annual exam.       1. Annual physical exam  - encourage well-balanced diet with fruits/vegetables, limited fried/fatty foods, and limited take-out   - encourage at least 150 minutes of moderate intensity aerobic activity weekly     2. Laboratory examination ordered as part of a  routine general medical examination  - CBC With Differential With Platelet  - Comp Metabolic Panel (14)  - Hemoglobin A1C  - TSH W Reflex To Free T4  - Lipid Panel    3. Carpal tunnel syndrome of left wrist  - will complete FMLA after patient drops off forms  - patient to also continue f/u with ortho as needed     Follow-up: in 1 year for next annual or sooner as needed    Outcome: Patient verbalizes understanding. Patient is notified to call with any questions, complications, allergies, or worsening or changing symptoms.  Patient is to call with any side effects or complications from the treatments as a result of today.     Total length of visit/charting: approximately 15 min    Getachwe Gonsales MD, 04/07/25, 3:09 PM      Please note that portions of this note may have been completed with a voice recognition program. Efforts were made to edit the dictations but occasionally words are mis-transcribed.       [1] No Known Allergies

## 2025-04-08 DIAGNOSIS — E78.1 HYPERTRIGLYCERIDEMIA: Primary | ICD-10-CM

## 2025-04-10 ENCOUNTER — TELEPHONE (OUTPATIENT)
Dept: FAMILY MEDICINE CLINIC | Facility: CLINIC | Age: 30
End: 2025-04-10

## 2025-04-10 NOTE — TELEPHONE ENCOUNTER
Patient dropped off [facility] paperwork to be completed. Paperwork was put in dr walters's bin.

## 2025-04-11 NOTE — TELEPHONE ENCOUNTER
Patient asked that form be faxed to number on bottom of form and then mail form to him.    Fax confirmation received.  Placed in mail.

## 2025-04-16 ENCOUNTER — TELEPHONE (OUTPATIENT)
Dept: FAMILY MEDICINE CLINIC | Facility: CLINIC | Age: 30
End: 2025-04-16

## 2025-04-16 NOTE — TELEPHONE ENCOUNTER
Patient came in and dropped off [facility] paperwork to be completed by Dr. Gonsales. Forms put in his bin. Please fax when completed.

## 2025-05-30 ENCOUNTER — OFFICE VISIT (OUTPATIENT)
Dept: SURGERY | Facility: CLINIC | Age: 30
End: 2025-05-30

## 2025-05-30 DIAGNOSIS — Z30.09 STERILIZATION CONSULT: Primary | ICD-10-CM

## 2025-05-30 PROCEDURE — 99203 OFFICE O/P NEW LOW 30 MIN: CPT | Performed by: UROLOGY

## 2025-05-30 RX ORDER — DIAZEPAM 10 MG/1
10 TABLET ORAL SEE ADMIN INSTRUCTIONS
Qty: 1 TABLET | Refills: 0 | Status: SHIPPED | OUTPATIENT
Start: 2025-05-30

## 2025-05-30 NOTE — PROGRESS NOTES
Urology Clinic Note - New Patient    Referring Provider:  No referring provider defined for this encounter.     Primary Care Provider:  Getachew Gonsales MD     Chief Complaint:   Referral for vasectomy    HPI:   Darren Ly is a 29 year old male referred for interest in male sterility via vasectomy.     He has 1 current child with his partner who is currently pregnant with second child.  He has thought about vasectomy for a while now and desires no further children.    Blood thinners: no  Prior bleeding problems: no  Prior scrotal surgery: no  Occupation: works for [employer]; occasinal heavy lifting     PSA:  No results found for: \"PSA\", \"PERCENTPSA\", \"PSAS\", \"PSAULTRA\"     History:   No past medical history on file.    No past surgical history on file.    Family History   Problem Relation Age of Onset    Cancer Father     Diabetes Father        Social History     Socioeconomic History    Marital status: Single   Tobacco Use    Smoking status: Never     Passive exposure: Never    Smokeless tobacco: Never   Vaping Use    Vaping status: Never Used   Substance and Sexual Activity    Alcohol use: Never    Drug use: Never    Sexual activity: Yes     Social Drivers of Health      Received from [facility]    Housing Stability       Medications (Active prior to today's visit):  No current outpatient medications on file.       Allergies:  No Known Allergies    Review of Systems:   A comprehensive 10-point review of systems was completed.  Pertinent positives and negatives are noted in the the HPI.    Physical Exam:   CONSTITUTIONAL: Well developed, well nourished, in no acute distress  NEUROLOGIC: Alert and oriented  HEAD: Normocephalic, atraumatic  EYES: Sclera non-icteric  ENT: Hearing intact, moist mucous membranes  NECK: No obvious goiter or masses  RESPIRATORY: Normal respiratory effort  SKIN: No evident rashes  ABDOMEN: Soft, non-tender, non-distended  GENITOURINARY: Normal  phallus, orthotopic meatus, normal bilateral testicles, palpable vasa bilaterally    Assessment & Plan:   Darren Ly is a 29 year old male referred for vasectomy consult.    We discussed that a vasectomy is intended to be a permanent form of contraception and that if he desires fertility after vasectomy, options included vasectomy reversal and sperm retrieval with possible in vitro fertilization. These options are not always successful and may be very expensive.    We also discussed the risks of vasectomy which include symptomatic hematoma and infection (1-2%), chronic scrotal pain (6%; caused by congestive epididymitis, sperm granuloma and/or infective epididymoorchitis), need for repeat vasectomy (<1% of cases), need for additional procedures, vasectomy failure, and unwanted pregnancy (1 in 2000 men). The chronic scrotal discomfort may be no more than a low-grade chronic ache that causes little disability and requires only symptomatic treatment. However, a small percentage of patients will be sufficiently debilitated to seek epididymectomy or even orchiectomy (removal of the epididymis and/or testicle). Furthermore, for a very small number of patients, even this radical surgery will not provide relief from their scrotal discomfort.     We discussed that he will have two small incisions in his scrotum with dissolvable sutures. He was told that vasectomy does NOT produce immediate sterility and that following vasectomy, another form of contraception is required until vas occlusion is confirmed by post-vasectomy semen analysis. Post-vasectomy semen analysis will be obtained 10-12 weeks after the vasectomy. He was told that he may stop using other methods of contraception when examination of one well-mixed, uncentrifuged, fresh post-vasectomy semen specimen shows azoospermia or only rare non-motile sperm. He was once again told that even after vas occlusion is confirmed, vasectomy is not 100% reliable in  preventing pregnancy and that the risk of pregnancy after vasectomy is approximately 1 in 2,000 men who have post-vasectomy azoospermia or semen analysis showing rare non-motile sperm. The reasons are early recanalization of the vas deferens or the presence of an accessory vas unrecognized at the time of surgery. There have also been cases of DNA-confirmed paternity despite documented azoospermia before and after conception. He was told to refrain from ejaculation for approximately one week after vasectomy.    I advised him to hold any blood thinners prior to this procedure, to trim the hair on the scrotum the day before the procedure, and to arrange for someone to drive him to and from the procedure if possible. He understands that for a few days after the procedure he will need to take it easy with minimal activity, ice the area, and keep the compression dressing/jock strap on. He understands and elects to proceed with vasectomy.      - Book for vasectomy under local anesthesia in clinic with me, next available  - Hold NSAIDs or Aspirin for 7 days before procedure  - Valium 10mg PRN before procedure (informed must have ride home if taking)    Thank you for this consult.    I have personally reviewed all relevant medical records, labs, and imaging.       Davis Lechuga MD  Staff Urologist  [facility]  Office: [phone]

## 2025-07-07 ENCOUNTER — PROCEDURE (OUTPATIENT)
Dept: SURGERY | Facility: CLINIC | Age: 30
End: 2025-07-07
Payer: COMMERCIAL

## 2025-07-07 VITALS — HEART RATE: 80 BPM | SYSTOLIC BLOOD PRESSURE: 128 MMHG | DIASTOLIC BLOOD PRESSURE: 89 MMHG

## 2025-07-07 DIAGNOSIS — Z30.09 STERILIZATION CONSULT: Primary | ICD-10-CM

## 2025-07-07 DIAGNOSIS — Z30.2 ENCOUNTER FOR MALE STERILIZATION PROCEDURE: ICD-10-CM

## 2025-07-07 PROCEDURE — 3074F SYST BP LT 130 MM HG: CPT | Performed by: UROLOGY

## 2025-07-07 PROCEDURE — 3079F DIAST BP 80-89 MM HG: CPT | Performed by: UROLOGY

## 2025-07-07 PROCEDURE — 55250 REMOVAL OF SPERM DUCT(S): CPT | Performed by: UROLOGY

## 2025-07-07 NOTE — PROGRESS NOTES
Clinic Procedure Note    INDICATIONS:   Darren Ly is a 29 year old male desiring elective sterility via bilateral vasectomy.     PROCEDURE:       1. Bilateral vasectomy    DATE OF PROCEDURE: 7/6/2025     PRE-PROCEDURE DIAGNOSIS: Family Planning/Desires Male Sterility    POST-PROCEDURE DIAGNOSIS: Same     SURGEON: Davis Lechuga MD    ANESTHESIA: Local (10 mL of lidocaine 1% plainon each side)    SPECIMENS: Segment of RIGHT vas, segment of LEFT vas    FINDINGS:  Right sided vas very hypervascular; additional surgical clips used for vas occlusion and hemostasis. Hemostasis excellent at conclusion. Otherwise normal bilateral vasectomy performed.    DISCUSSION:  We discussed that a vasectomy is intended to be a permanent form of contraception and that if he desires fertility after vasectomy, options included vasectomy reversal and sperm retrieval with possible in vitro fertilization. These options are not always successful and may be very expensive.    We also discussed the risks of vasectomy which include symptomatic hematoma and infection (1-2%), chronic scrotal pain (6%; caused by congestive epididymitis, sperm granuloma and/or infective epididymoorchitis), need for repeat vasectomy (<1% of cases), need for additional procedures, vasectomy failure, and unwanted pregnancy (1 in 2000 men). The chronic scrotal discomfort may be no more than a low-grade chronic ache that causes little disability and requires only symptomatic treatment. However, a small percentage of patients will be sufficiently debilitated to seek epididymectomy or even orchiectomy (removal of the epididymis and/or testicle). Furthermore, for a very small number of patients, even this radical surgery will not provide relief from their scrotal discomfort.     We discussed that he will have two small incisions in his scrotum with dissolvable sutures. He was told that vasectomy does NOT produce immediate sterility and that following  vasectomy, another form of contraception is required until vas occlusion is confirmed by post-vasectomy semen analysis. Post-vasectomy semen analysis will be obtained 10-15 weeks after the vasectomy. He was told that he may stop using other methods of contraception when examination of one well-mixed, uncentrifuged, fresh post-vasectomy semen specimen shows azoospermia or only rare non-motile sperm. He was once again told that even after vas occlusion is confirmed, vasectomy is not 100% reliable in preventing pregnancy and that the risk of pregnancy after vasectomy is approximately 1 in 2,000 men who have post-vasectomy azoospermia or semen analysis showing rare non-motile sperm. The reasons are early recanalization of the vas deferens or the presence of an accessory vas unrecognized at the time of surgery. There have also been cases of DNA-confirmed paternity despite documented azoospermia before and after conception. He was told to refrain from ejaculation for approximately one week after vasectomy.    After discussion of all risks and benefits the patient elected to proceed and informed consent form was signed.    PROCEDURE:     After the appropriate time out checklist was performed, confirming the correct patient and procedure, the scrotum was prepped and draped in standard fashion.     The left vas was grasped and brought up underneath the skin surface. The skin, Dartos, and vasal sheath were injected with lidocaine. Once the skin was numb, a #15 scalpel was used to make a small incision overlying the vas in the left hemiscrotum. A small snap was used to spread the tissue overlying the vas to make room for the vas ring forceps. The ring forceps was used to grasp the vas deferens to hold it in place. I used the 15 blade to cut away the vasal sheath and used additional vas clamps to re-grasp the isolated vas.  I then pushed down any remaining attached vasal sheath and secured each end of the vas with a small  mosquito clamp.   A small segment of the vas was excised and passed off the field for specimen. The needle tip Bovie was used to cauterize the lumen of both the abdominal and testicular ends of the transected vas. Any present bleeding vessels were treated with cautery. There was no bleeding seen from the vasal stumps, and they were dropped back into the scrotum. The dartos muscle was cauterized.   This area was then kept open to assess for interval bleeding.     The right vas was grasped and brought up underneath the skin surface. The skin, Dartos, and vasal sheath were injected with lidocaine. Once the skin was numb, a #15 scalpel was used to make a small incision overlying the vas in the right hemiscrotum. A small snap was used to spread the tissue overlying the vas to make room for the vas ring forceps. The ring forceps was used to grasp the vas deferens to hold it in place. I used the 15 blade to cut away the vasal sheath and used additional vas clamps to re-grasp the isolated vas.  I then pushed down any remaining attached vasal sheath and secured each end of the vas with a small mosquito clamp. A small segment of the vas was excised and passed off the field for specimen. The needle tip Bovie was used to cauterize the lumen of both the abdominal and testicular ends of the transected vas. Any present bleeding vessels were treated with cautery.  This side was slightly more hypervascular with some additional vessels on the sheath, I therefore used small metal clips for hemostasis in addition to cautery.  I also placed the small clips on each vas stump as well for hemostasis and additional occlusion.  There was then no bleeding seen from the vasal stumps, and they were dropped back into the scrotum. The dartos muscle was cauterized.   This area was then kept open to assess for interval bleeding.     I then inspected the left side incision, there was no bleeding and this incision was closed with running 3-0 chromic  suture.   I then inspected the right side incision; there was no bleeding and this incision was closed with running 3-0 chromic suture.     I then used skin glue on each incision. Once dry, fluff gauze and scrotal support were placed.     DISPOSITION: Home    FOLLOW-UP: Post-procedure care instructions were given to the patient. Post-vasectomy semen analysis will be performed 10-15 weeks after vasectomy. Patient knows to use another form of contraception until vasal occlusion is confirmed by post-vasectomy semen analysis.  Job requires heavy lifting; note given to be off work for 7-10 days.            Davis Lechuga MD  Staff Urologist  [facility]  Office: [phone]

## 2025-07-09 ENCOUNTER — RESULTS FOLLOW-UP (OUTPATIENT)
Dept: SURGERY | Facility: CLINIC | Age: 30
End: 2025-07-09

## 2025-07-10 ENCOUNTER — TELEPHONE (OUTPATIENT)
Facility: LOCATION | Age: 30
End: 2025-07-10

## 2025-07-10 NOTE — TELEPHONE ENCOUNTER
Darren Ly was scheduled for an appt on 7/10 with a visit reason of Consult.    Appointment was a No Show.  Unable to reach patient.    Clinical Staff: Please notify provider if there were labs or x-rays being held for review at this missed visit.

## 2025-07-11 ENCOUNTER — MOBILE ENCOUNTER (OUTPATIENT)
Dept: SURGERY | Facility: CLINIC | Age: 30
End: 2025-07-11

## 2025-07-14 ENCOUNTER — TELEPHONE (OUTPATIENT)
Dept: SURGERY | Facility: CLINIC | Age: 30
End: 2025-07-14

## 2025-07-14 NOTE — TELEPHONE ENCOUNTER
Called pt; he is doing well. Minor pain on right side; no swelling. Incision looks ok. Discussed ongoing tylenol/ibuprofen and scrotal support. He will do this.

## 2025-07-15 NOTE — TELEPHONE ENCOUNTER
This encounter is now closed.     Second call.   RN called patient to follow up on offered appt.   No answer. Left message.     Appointment today cancelled. No confirmation.

## 2025-07-15 NOTE — TELEPHONE ENCOUNTER
Component of whitecoat hypertension.  Blood pressure is significantly better at home.  Continue current management.  Patient takes Lasix daily.   RN called patient to follow up on offered appt.   No answer. Left message.

## 2025-08-21 ENCOUNTER — OFFICE VISIT (OUTPATIENT)
Dept: FAMILY MEDICINE CLINIC | Facility: CLINIC | Age: 30
End: 2025-08-21

## 2025-08-21 VITALS
HEART RATE: 78 BPM | OXYGEN SATURATION: 98 % | DIASTOLIC BLOOD PRESSURE: 60 MMHG | SYSTOLIC BLOOD PRESSURE: 120 MMHG | BODY MASS INDEX: 28.77 KG/M2 | RESPIRATION RATE: 18 BRPM | HEIGHT: 66 IN | WEIGHT: 179 LBS

## 2025-08-21 DIAGNOSIS — R05.3 CHRONIC COUGH: Primary | ICD-10-CM

## 2025-08-21 DIAGNOSIS — R09.82 POSTNASAL DRIP: ICD-10-CM

## 2025-08-21 DIAGNOSIS — K21.9 GASTROESOPHAGEAL REFLUX DISEASE, UNSPECIFIED WHETHER ESOPHAGITIS PRESENT: ICD-10-CM

## 2025-08-21 PROCEDURE — 3078F DIAST BP <80 MM HG: CPT | Performed by: STUDENT IN AN ORGANIZED HEALTH CARE EDUCATION/TRAINING PROGRAM

## 2025-08-21 PROCEDURE — 99213 OFFICE O/P EST LOW 20 MIN: CPT | Performed by: STUDENT IN AN ORGANIZED HEALTH CARE EDUCATION/TRAINING PROGRAM

## 2025-08-21 PROCEDURE — 3008F BODY MASS INDEX DOCD: CPT | Performed by: STUDENT IN AN ORGANIZED HEALTH CARE EDUCATION/TRAINING PROGRAM

## 2025-08-21 PROCEDURE — 3074F SYST BP LT 130 MM HG: CPT | Performed by: STUDENT IN AN ORGANIZED HEALTH CARE EDUCATION/TRAINING PROGRAM

## 2025-08-21 RX ORDER — PANTOPRAZOLE SODIUM 40 MG/1
40 TABLET, DELAYED RELEASE ORAL
Qty: 30 TABLET | Refills: 0 | Status: SHIPPED | OUTPATIENT
Start: 2025-08-21

## 2025-08-21 RX ORDER — ALBUTEROL SULFATE 90 UG/1
2 INHALANT RESPIRATORY (INHALATION) EVERY 6 HOURS PRN
Qty: 18 G | Refills: 0 | Status: SHIPPED | OUTPATIENT
Start: 2025-08-21

## 2025-08-21 RX ORDER — FLUTICASONE PROPIONATE 50 MCG
2 SPRAY, SUSPENSION (ML) NASAL DAILY PRN
Qty: 16 G | Refills: 0 | Status: SHIPPED | OUTPATIENT
Start: 2025-08-21

## 2025-08-21 RX ORDER — AZELASTINE 1 MG/ML
2 SPRAY, METERED NASAL NIGHTLY PRN
Qty: 30 ML | Refills: 0 | Status: SHIPPED | OUTPATIENT
Start: 2025-08-21

## (undated) NOTE — LETTER
Date: 4/26/2021    Patient Name: Catherine Russell          To Whom it may concern: The above patient was seen at the [facility] for treatment of a medical condition. No pushing, pulling, carrying, lifting greater than 15 lbs.  Fo

## (undated) NOTE — LETTER
Date & Time: 12/10/2020, 8:12 PM  Patient: Leticia Baron  Encounter Provider(s):    CLAUDINE Churchill       To Whom It May Concern:    Roc Quinn was seen and treated in our department on 12/10/2020.  He can return to work with these limitat

## (undated) NOTE — LETTER
IMMEDIATE CARE Mela Ballesteros  [address] [facility]  [place] [facility] [address]  [phone]     Patient: Catherine Russell   YOB: 1995   Date of Visit: 10/19/2020     Dear Employer,        October 23, 2020    At [facility], we are ta Persons infected with SARS-CoV-2 who never develop COVID-19 symptoms may discontinue isolation and other precautions 10 days after the date of their first positive RT-PCR test for SARS-CoV-2 RNA.     Persons who are asymptomatic but have been exposed, CDC r

## (undated) NOTE — LETTER
Date: 3/30/2021    Patient Name: Nicolasa Cook          To Whom it may concern: The above patient was seen at the [facility] for treatment of a medical condition. No use of left hand for 4 weeks.          Sincerely,    Celi Tavares

## (undated) NOTE — LETTER
Date: 3/15/2022    Patient Name: Harry Craig          To Whom it may concern: This letter has been written at the patient's request. The above patient was seen at the [facility] for treatment of a medical condition. Patient to remain off of work from 3/12/22 until further notice. Patient will be re-evaluated in 2 weeks. Sincerely,      Skylar Boswell MD  Hand, Wrist, & Elbow Surgery  [facility] Orthopaedic Surgery  [facility] [address], [address] [facility], [address] [facility] 93 org  [email or web address]  t: [id number]  f: [phone]

## (undated) NOTE — LETTER
Date: 3/29/2022    Patient Name: Arminda Jackson          To Whom it may concern: This letter has been written at the patient's request. The above patient was seen at the [facility] for treatment of a medical condition. Patient can return to work full duty without restrictions starting April 4, 2022        Sincerely,      Roshni Jorge MD  Hand, Wrist, & Elbow Surgery  [facility] Orthopaedic Surgery  [facility] [address], [address] [facility], [facility]  [email or web address]  t: [id number]  f: [phone]

## (undated) NOTE — LETTER
To Whom It May Concern:    Darren Ly was evaluated by me on this date of 4/7/25. Based on patient's chronic medical condition he should be excused from all work activities on the dates of 4/8/25 and 4/9/25.    If you require additional information please contact our office.    Sincerely,        Getachew Gonsales MD  [facility], [address] [address]  [phone]        Document generated by:  Getachew Gonsales MD

## (undated) NOTE — LETTER
Date: 3/30/2021    Patient Name: Judie Byers          To Whom it may concern: The above patient was seen at the [facility] for treatment of a medical condition. No pushing, pulling, carrying, lifting greater than 15 lbs.  Fo

## (undated) NOTE — LETTER
IMMEDIATE CARE Juan Manuel Sargent  [address] [facility]  [place] [address]  [phone]     Patient: Earney Lipoma   YOB: 1995   Date of Visit: 10/19/2020     Dear Employer,        October 23, 2020    At [facility], we are ta Persons infected with SARS-CoV-2 who never develop COVID-19 symptoms may discontinue isolation and other precautions 10 days after the date of their first positive RT-PCR test for SARS-CoV-2 RNA.     Persons who are asymptomatic but have been exposed, CDC r

## (undated) NOTE — LETTER
Date: 1/06/2021    Patient Name: Judie Byers          To Whom it may concern: The above patient was seen at the [facility] for treatment of a medical condition. No use of left hand.  Follow up in 2 weeks for repeat evaluati

## (undated) NOTE — LETTER
Date: 2/4/2021    Patient Name: Jessica Farfan          To Whom it may concern: The above patient was seen at the [facility] for treatment of a medical condition. Patient can begin light duty work 2/8/21.  Patient should be al

## (undated) NOTE — LETTER
IMMEDIATE CARE Gwendolyn Blank  [address] [place] [address]  [place] [address]  [phone]     Patient: Meliton Zhang   YOB: 1995   Date of Visit: 10/19/2020     Dear Employer,        October 23, 2020    At [facility], we are ta Persons infected with SARS-CoV-2 who never develop COVID-19 symptoms may discontinue isolation and other precautions 10 days after the date of their first positive RT-PCR test for SARS-CoV-2 RNA.     Persons who are asymptomatic but have been exposed, CDC r

## (undated) NOTE — LETTER
Date: 3/29/2022    Patient Name: Paige Ocampo          To Whom it may concern: This letter has been written at the patient's request. The above patient was seen at the [facility] for treatment of a medical condition. Patient returned to work full duty without restrictions starting April 4, 2022        Sincerely,      Xi Putnam MD  Hand, Wrist, & Elbow Surgery  [facility] Orthopaedic Surgery  [place] [address], [address] [facility], [address] [place] [address]  [email or web address]  t: [id number]  f: [phone]

## (undated) NOTE — LETTER
Date: 9/26/2023    Patient Name: Cora Alexandre          To Whom it may concern: The above patient was seen at the [facility] for treatment of a medical condition. Patient to be off work from Aug 7th,2023 until further notice. Sincerely,    Armen Davis MD  Hand, Wrist, & Elbow Surgery  [facility] Orthopaedic Surgery  [facility] [address], [address] [facility], [place] 93 org  [email or web address]  t: [id number]  f: [phone]

## (undated) NOTE — LETTER
Date & Time: 10/19/2020, 9:15 AM  Patient: Dawson Gomes  Encounter Provider(s):    LISSA Florence       To Whom It May Concern:    Mali Cheryl was seen and treated in our department on 10/19/2020.  He Must quarantine until he receives

## (undated) NOTE — LETTER
Date: 11/20/2023    Patient Name: Ashley Officer          To Whom it may concern: The above patient was seen at the [facility] for treatment of a medical condition. Patient can return to work without restriction on 11/22/23. Sincerely,    Isabel Aldana MD  Hand, Wrist, & Elbow Surgery  [facility] Orthopaedic Surgery  [facility] [address], [address] [facility], [address] [place] 93 org  [email or web address]  t: [id number]  f: [phone]

## (undated) NOTE — LETTER
25          Darren Ly  :  1995      To Whom It May Concern:    This patient was seen in our office on 25 .  Work status:  May return to work full-time, no restrictions    May return to work status per above effective 25.    If this office may be of further assistance, please do not hesitate to contact us.      Sincerely,        Davis Lechuga MD

## (undated) NOTE — LETTER
To Whom It May Concern:    Darren Ly was evaluated for a medical condition on 7/8/2024 after sustaining injury outside of work on 06/15/2024. Based on clinical presentation patient should be excused from all work activities from 6/15/2024 to 7/9/2024. Patient can return to work as of 7/10/2024 and onwards.     If you require additional information please contact our office.    Sincerely,      Getachew Gonsales MD  [facility], [address] [address]  [phone]        Document generated by:  Getachew Gonsales MD

## (undated) NOTE — LETTER
Date: 3/4/2021    Patient Name: Amy Walker          To Whom it may concern: The above patient was seen at the [facility] for treatment of a medical condition.     Patient can return to work without restrictions starting 3/9/21

## (undated) NOTE — LETTER
Date: 5/7/2021    Patient Name: Garth Hester          To Whom it may concern:     This letter has been written at the patient's request. The above patient was seen at one of the [facility] locations for treatment of a medical con

## (undated) NOTE — LETTER
Date: 3/29/2021    Patient Name: Charisma Hackett          To Whom it may concern: The above patient was seen at the [facility] for treatment of a medical condition. Light duty for 4 weeks.          Sincerely,    Calvin Small MD

## (undated) NOTE — LETTER
Date: 12/22/2020    Patient Name: Say Schultz          To Whom it may concern: The above patient was seen at the [facility] for treatment of a medical condition. No use of left hand.  Follow up in 2 weeks for repeat evaluat

## (undated) NOTE — LETTER
Date: 5/7/2021    Patient Name: Charisma Hackett          To Whom it may concern:     This letter has been written at the patient's request. The above patient was seen at one of the [facility] locations for treatment of a medical con

## (undated) NOTE — LETTER
Date: 12/14/2020    Patient Name: Meliton Zhang          To Whom it may concern: The above patient was seen at the [facility] for treatment of a medical condition. No use of left hand.  Patient should be allowed to wear brace

## (undated) NOTE — LETTER
Date: 5/7/2021    Patient Name: Dawson Gomes          To Whom it may concern:     This letter has been written at the patient's request. The above patient was seen at one of the [address] [facility] for treatment of a medical con